# Patient Record
Sex: MALE | Race: BLACK OR AFRICAN AMERICAN | Employment: UNEMPLOYED | URBAN - METROPOLITAN AREA
[De-identification: names, ages, dates, MRNs, and addresses within clinical notes are randomized per-mention and may not be internally consistent; named-entity substitution may affect disease eponyms.]

---

## 2017-09-01 ENCOUNTER — OFFICE VISIT (OUTPATIENT)
Dept: FAMILY MEDICINE CLINIC | Age: 22
End: 2017-09-01
Payer: MEDICAID

## 2017-09-01 VITALS
TEMPERATURE: 98.7 F | DIASTOLIC BLOOD PRESSURE: 46 MMHG | BODY MASS INDEX: 17.44 KG/M2 | HEIGHT: 72 IN | SYSTOLIC BLOOD PRESSURE: 107 MMHG | HEART RATE: 64 BPM | WEIGHT: 128.8 LBS

## 2017-09-01 DIAGNOSIS — M41.124 ADOLESCENT IDIOPATHIC SCOLIOSIS OF THORACIC REGION: Primary | ICD-10-CM

## 2017-09-01 DIAGNOSIS — Z11.4 SCREENING FOR HIV (HUMAN IMMUNODEFICIENCY VIRUS): ICD-10-CM

## 2017-09-01 PROCEDURE — 99203 OFFICE O/P NEW LOW 30 MIN: CPT | Performed by: FAMILY MEDICINE

## 2017-09-01 ASSESSMENT — ENCOUNTER SYMPTOMS
CHEST TIGHTNESS: 0
APNEA: 0
COUGH: 0
CHOKING: 0

## 2018-01-12 ENCOUNTER — HOSPITAL ENCOUNTER (EMERGENCY)
Age: 23
Discharge: HOME | End: 2018-01-12
Attending: EMERGENCY MEDICINE
Payer: MEDICAID

## 2018-01-12 VITALS
DIASTOLIC BLOOD PRESSURE: 78 MMHG | SYSTOLIC BLOOD PRESSURE: 107 MMHG | RESPIRATION RATE: 18 BRPM | BODY MASS INDEX: 19.67 KG/M2 | HEART RATE: 72 BPM | WEIGHT: 145 LBS | OXYGEN SATURATION: 100 % | TEMPERATURE: 98.6 F

## 2018-01-12 DIAGNOSIS — F10.920 ACUTE ALCOHOLIC INTOXICATION WITHOUT COMPLICATION (HCC): ICD-10-CM

## 2018-01-12 DIAGNOSIS — R11.2 NAUSEA AND VOMITING, INTRACTABILITY OF VOMITING NOT SPECIFIED, UNSPECIFIED VOMITING TYPE: ICD-10-CM

## 2018-01-12 DIAGNOSIS — F12.10 MILD TETRAHYDROCANNABINOL (THC) ABUSE: ICD-10-CM

## 2018-01-12 DIAGNOSIS — R10.9 ABDOMINAL PAIN, UNSPECIFIED ABDOMINAL LOCATION: Primary | ICD-10-CM

## 2018-01-12 LAB
AMPHETAMINE SCREEN URINE: NEGATIVE
BARBITURATE SCREEN URINE: NEGATIVE
BENZODIAZEPINE SCREEN, URINE: NEGATIVE
BUPRENORPHINE URINE: ABNORMAL
CANNABINOID SCREEN URINE: POSITIVE
COCAINE METABOLITE, URINE: NEGATIVE
MDMA URINE: ABNORMAL
METHADONE SCREEN, URINE: NEGATIVE
METHAMPHETAMINE, URINE: ABNORMAL
OPIATES, URINE: NEGATIVE
OXYCODONE SCREEN URINE: NEGATIVE
PHENCYCLIDINE, URINE: NEGATIVE
PROPOXYPHENE, URINE: ABNORMAL
TEST INFORMATION: ABNORMAL
TRICYCLIC ANTIDEPRESSANTS, UR: ABNORMAL

## 2018-01-12 PROCEDURE — 99284 EMERGENCY DEPT VISIT MOD MDM: CPT

## 2018-01-12 PROCEDURE — 6360000002 HC RX W HCPCS: Performed by: EMERGENCY MEDICINE

## 2018-01-12 PROCEDURE — 96374 THER/PROPH/DIAG INJ IV PUSH: CPT

## 2018-01-12 PROCEDURE — 80307 DRUG TEST PRSMV CHEM ANLYZR: CPT

## 2018-01-12 PROCEDURE — 2580000003 HC RX 258: Performed by: EMERGENCY MEDICINE

## 2018-01-12 RX ORDER — ONDANSETRON 4 MG/1
4 TABLET, FILM COATED ORAL ONCE
Status: COMPLETED | OUTPATIENT
Start: 2018-01-12 | End: 2018-01-12

## 2018-01-12 RX ORDER — ONDANSETRON 4 MG/1
4 TABLET, FILM COATED ORAL EVERY 8 HOURS PRN
Qty: 10 TABLET | Refills: 0 | Status: SHIPPED | OUTPATIENT
Start: 2018-01-12 | End: 2018-09-17

## 2018-01-12 RX ORDER — ONDANSETRON 2 MG/ML
INJECTION INTRAMUSCULAR; INTRAVENOUS
Status: DISCONTINUED
Start: 2018-01-12 | End: 2018-01-12 | Stop reason: HOSPADM

## 2018-01-12 RX ORDER — ONDANSETRON 2 MG/ML
4 INJECTION INTRAMUSCULAR; INTRAVENOUS ONCE
Status: COMPLETED | OUTPATIENT
Start: 2018-01-12 | End: 2018-01-12

## 2018-01-12 RX ORDER — 0.9 % SODIUM CHLORIDE 0.9 %
1000 INTRAVENOUS SOLUTION INTRAVENOUS ONCE
Status: COMPLETED | OUTPATIENT
Start: 2018-01-12 | End: 2018-01-12

## 2018-01-12 RX ORDER — ONDANSETRON 2 MG/ML
4 INJECTION INTRAMUSCULAR; INTRAVENOUS ONCE
Status: DISCONTINUED | OUTPATIENT
Start: 2018-01-12 | End: 2018-01-12

## 2018-01-12 RX ORDER — 0.9 % SODIUM CHLORIDE 0.9 %
1000 INTRAVENOUS SOLUTION INTRAVENOUS ONCE
Status: DISCONTINUED | OUTPATIENT
Start: 2018-01-12 | End: 2018-01-12

## 2018-01-12 RX ORDER — MAGNESIUM HYDROXIDE/ALUMINUM HYDROXICE/SIMETHICONE 120; 1200; 1200 MG/30ML; MG/30ML; MG/30ML
30 SUSPENSION ORAL ONCE
Status: DISCONTINUED | OUTPATIENT
Start: 2018-01-12 | End: 2018-01-12 | Stop reason: HOSPADM

## 2018-01-12 RX ADMIN — ONDANSETRON 4 MG: 2 INJECTION INTRAMUSCULAR; INTRAVENOUS at 01:09

## 2018-01-12 RX ADMIN — SODIUM CHLORIDE 1000 ML: 9 INJECTION, SOLUTION INTRAVENOUS at 01:08

## 2018-01-12 RX ADMIN — ONDANSETRON 4 MG: 4 TABLET, FILM COATED ORAL at 00:35

## 2018-01-12 ASSESSMENT — PAIN SCALES - GENERAL: PAINLEVEL_OUTOF10: 5

## 2018-01-12 ASSESSMENT — PAIN DESCRIPTION - DESCRIPTORS: DESCRIPTORS: SHARP

## 2018-01-12 ASSESSMENT — PAIN DESCRIPTION - LOCATION: LOCATION: ABDOMEN
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
## 2018-09-17 ENCOUNTER — OFFICE VISIT (OUTPATIENT)
Dept: FAMILY MEDICINE CLINIC | Age: 23
End: 2018-09-17
Payer: MEDICAID

## 2018-09-17 VITALS
DIASTOLIC BLOOD PRESSURE: 69 MMHG | SYSTOLIC BLOOD PRESSURE: 109 MMHG | TEMPERATURE: 97.8 F | WEIGHT: 150 LBS | HEART RATE: 55 BPM | BODY MASS INDEX: 18.65 KG/M2 | HEIGHT: 75 IN

## 2018-09-17 DIAGNOSIS — Z11.4 ENCOUNTER FOR SCREENING FOR HIV: ICD-10-CM

## 2018-09-17 DIAGNOSIS — Z00.00 ANNUAL PHYSICAL EXAM: Primary | ICD-10-CM

## 2018-09-17 DIAGNOSIS — H61.22 LEFT EAR IMPACTED CERUMEN: ICD-10-CM

## 2018-09-17 DIAGNOSIS — Z23 NEED FOR VACCINATION: ICD-10-CM

## 2018-09-17 PROCEDURE — G0008 ADMIN INFLUENZA VIRUS VAC: HCPCS | Performed by: FAMILY MEDICINE

## 2018-09-17 PROCEDURE — G8427 DOCREV CUR MEDS BY ELIG CLIN: HCPCS | Performed by: FAMILY MEDICINE

## 2018-09-17 PROCEDURE — G8420 CALC BMI NORM PARAMETERS: HCPCS | Performed by: FAMILY MEDICINE

## 2018-09-17 PROCEDURE — 99213 OFFICE O/P EST LOW 20 MIN: CPT | Performed by: FAMILY MEDICINE

## 2018-09-17 PROCEDURE — 90715 TDAP VACCINE 7 YRS/> IM: CPT | Performed by: FAMILY MEDICINE

## 2018-09-17 PROCEDURE — 1036F TOBACCO NON-USER: CPT | Performed by: FAMILY MEDICINE

## 2018-09-17 ASSESSMENT — PATIENT HEALTH QUESTIONNAIRE - PHQ9
SUM OF ALL RESPONSES TO PHQ9 QUESTIONS 1 & 2: 0
1. LITTLE INTEREST OR PLEASURE IN DOING THINGS: 0
2. FEELING DOWN, DEPRESSED OR HOPELESS: 0
SUM OF ALL RESPONSES TO PHQ QUESTIONS 1-9: 0
SUM OF ALL RESPONSES TO PHQ QUESTIONS 1-9: 0

## 2018-09-17 ASSESSMENT — ENCOUNTER SYMPTOMS
CONSTIPATION: 0
DIARRHEA: 0
SHORTNESS OF BREATH: 0
BLURRED VISION: 0
VOMITING: 0
NAUSEA: 0
COUGH: 0
ABDOMINAL PAIN: 0
WHEEZING: 0

## 2018-09-17 NOTE — PATIENT INSTRUCTIONS
Thank you for letting us take care of you today. We hope all your questions were addressed. If a question was overlooked or something else comes to mind after you return home, please contact a member of your Care Team listed below. Please make sure you have a routine office visit set up to follow-up on [address]. Your Care Team at [place] [address] is Team #2  Karon Encinas DO (Faculty)  Betsy Varela MD (Resident)  Leta Simmonds, MD (Resident)  Alexsander Alas MD (Resident)  Maria Teresa aGo MD (Resident)  Federica Rodríguez MD (Resident)  Santosh Wu, SATURNINO  Arnoldo Lott., Ivan Velasco, [address] ([facility])  Kami Levi RN, ([address] [facility])  Alec Jeffries, Ph.D., (Behavioral Services)  Renee Small, [address] [place] [address] (Clinical Pharmacist)     Office phone number: [phone]    If you need to get in right away due to illness, please be advised we have \"Same Day\" appointments available Monday-Friday. Please call us at [phone] option #3 to schedule your \"Same Day\" appointment. Patient Education        Earwax Blockage: Care Instructions  Your Care Instructions    Earwax is a natural substance that protects the ear canal. Normally, earwax drains from the ears and does not cause problems. Sometimes earwax builds up and hardens. Earwax blockage (also called cerumen impaction) can cause some loss of hearing and pain. When wax is tightly packed, you will need to have your doctor remove it. Follow-up care is a key part of your treatment and safety. Be sure to make and go to all appointments, and call your doctor if you are having problems. It's also a good idea to know your test results and keep a list of the medicines you take. How can you care for yourself at home? · Do not try to remove earwax with cotton swabs, fingers, or other objects. This can make the blockage worse and damage the eardrum.   · If your doctor recommends that you try to remove earwax at home:  ¨ Soften and loosen the earwax with warm mineral oil. You also can try hydrogen peroxide mixed with an equal amount of room temperature water. Place 2 drops of the fluid, warmed to body temperature, in the ear two times a day for up to 5 days. ¨ Once the wax is loose and soft, all that is usually needed to remove it from the ear canal is a gentle, warm shower. Direct the water into the ear, then tip your head to let the earwax drain out. Dry your ear thoroughly with a hair dryer set on low. Hold the dryer several inches from your ear. ¨ If the warm mineral oil and shower do not work, use an over-the-counter wax softener. Read and follow all instructions on the label. After using the wax softener, use an ear syringe to gently flush the ear. Make sure the flushing solution is body temperature. Cool or hot fluids in the ear can cause dizziness. When should you call for help? Call your doctor now or seek immediate medical care if:    · Pus or blood drains from your ear.     · Your ears are ringing or feel full.     · You have a loss of hearing.    Watch closely for changes in your health, and be sure to contact your doctor if:    · You have pain or reduced hearing after 1 week of home treatment.     · You have any new symptoms, such as nausea or balance problems. Where can you learn more? Go to https://Crispy Driven PixelspeLaunchPointeb.united healthcare practice solutions. org and sign in to your I Gotchu account. Enter N491 in the [facility] box to learn more about \"Earwax Blockage: Care Instructions. \"     If you do not have an account, please click on the \"Sign Up Now\" link. Current as of: November 20, 2017  Content Version: 11.7  © 6078-4433 [employer]. Care instructions adapted under license by [facility]). If you have questions about a medical condition or this instruction, always ask your healthcare professional. [address] any warranty or liability for your use of this information.

## 2018-10-29 ENCOUNTER — OFFICE VISIT (OUTPATIENT)
Dept: FAMILY MEDICINE CLINIC | Age: 23
End: 2018-10-29
Payer: MEDICAID

## 2018-10-29 ENCOUNTER — HOSPITAL ENCOUNTER (OUTPATIENT)
Age: 23
Setting detail: SPECIMEN
Discharge: HOME | End: 2018-10-29
Payer: MEDICAID

## 2018-10-29 VITALS
WEIGHT: 135 LBS | TEMPERATURE: 97.9 F | DIASTOLIC BLOOD PRESSURE: 74 MMHG | HEIGHT: 73 IN | BODY MASS INDEX: 17.89 KG/M2 | HEART RATE: 56 BPM | SYSTOLIC BLOOD PRESSURE: 114 MMHG

## 2018-10-29 DIAGNOSIS — H61.22 LEFT EAR IMPACTED CERUMEN: Primary | ICD-10-CM

## 2018-10-29 DIAGNOSIS — Z11.4 ENCOUNTER FOR SCREENING FOR HIV: ICD-10-CM

## 2018-10-29 LAB — HIV AG/AB: NONREACTIVE

## 2018-10-29 PROCEDURE — G8419 CALC BMI OUT NRM PARAM NOF/U: HCPCS | Performed by: FAMILY MEDICINE

## 2018-10-29 PROCEDURE — 1036F TOBACCO NON-USER: CPT | Performed by: FAMILY MEDICINE

## 2018-10-29 PROCEDURE — G8484 FLU IMMUNIZE NO ADMIN: HCPCS | Performed by: FAMILY MEDICINE

## 2018-10-29 PROCEDURE — 99213 OFFICE O/P EST LOW 20 MIN: CPT | Performed by: FAMILY MEDICINE

## 2018-10-29 PROCEDURE — G8427 DOCREV CUR MEDS BY ELIG CLIN: HCPCS | Performed by: FAMILY MEDICINE

## 2018-10-29 ASSESSMENT — ENCOUNTER SYMPTOMS
NAUSEA: 0
SHORTNESS OF BREATH: 0
COUGH: 0
CONSTIPATION: 0
DIARRHEA: 0
SORE THROAT: 0
BACK PAIN: 0
VOMITING: 0
ABDOMINAL PAIN: 0

## 2018-10-29 NOTE — PATIENT INSTRUCTIONS
Thank you for letting us take care of you today. We hope all your questions were addressed. If a question was overlooked or something else comes to mind after you return home, please contact a member of your Care Team listed below. Please make sure you have a routine office visit set up to follow-up on [address]. Your Care Team at [place] [address] is Team #4  Velia Saunders MD (Faculty)  Henrry Sandoval MD (Naveen Vicente MD (Resident)  Emelina Antonio MD (Resident)  Tavo Hilton MD (Resident)  Juan Araiza MD (Resident)  Misti Núñez MD (Resident)  JEFFERY Estrada., JEB Saavedra., CHANDRIKA Dougherty ([facility])  Finn Acosta RN, ([address] [facility])  Shannon Ivey, Ph.D., (Behavioral Services)  Trevon Barger, [address] [place] [address] (Clinical Pharmacist)       Office phone number: [phone]    If you need to get in right away due to illness, please be advised we have \"Same Day\" appointments available Monday-Friday. Please call us at [phone] option #3 to schedule your \"Same Day\" appointment. Patient Education        Earwax Blockage: Care Instructions  Your Care Instructions    Earwax is a natural substance that protects the ear canal. Normally, earwax drains from the ears and does not cause problems. Sometimes earwax builds up and hardens. Earwax blockage (also called cerumen impaction) can cause some loss of hearing and pain. When wax is tightly packed, you will need to have your doctor remove it. Follow-up care is a key part of your treatment and safety. Be sure to make and go to all appointments, and call your doctor if you are having problems. It's also a good idea to know your test results and keep a list of the medicines you take. How can you care for yourself at home? · Do not try to remove earwax with cotton swabs, fingers, or other objects. This can make the blockage worse and damage the eardrum.   · If your doctor recommends that you try to remove earwax at home:  ¨ Soften and loosen the earwax with warm mineral oil. You also can try hydrogen peroxide mixed with an equal amount of room temperature water. Place 2 drops of the fluid, warmed to body temperature, in the ear two times a day for up to 5 days. ¨ Once the wax is loose and soft, all that is usually needed to remove it from the ear canal is a gentle, warm shower. Direct the water into the ear, then tip your head to let the earwax drain out. Dry your ear thoroughly with a hair dryer set on low. Hold the dryer several inches from your ear. ¨ If the warm mineral oil and shower do not work, use an over-the-counter wax softener. Read and follow all instructions on the label. After using the wax softener, use an ear syringe to gently flush the ear. Make sure the flushing solution is body temperature. Cool or hot fluids in the ear can cause dizziness. When should you call for help? Call your doctor now or seek immediate medical care if:    · Pus or blood drains from your ear.     · Your ears are ringing or feel full.     · You have a loss of hearing.    Watch closely for changes in your health, and be sure to contact your doctor if:    · You have pain or reduced hearing after 1 week of home treatment.     · You have any new symptoms, such as nausea or balance problems. Where can you learn more? Go to https://LYFE KitchenpefromAtoB.Lettuce. org and sign in to your [employer] account. Enter D726 in the [facility] box to learn more about \"Earwax Blockage: Care Instructions. \"     If you do not have an account, please click on the \"Sign Up Now\" link. Current as of: November 20, 2017  Content Version: 11.7  © 1128-7731 [employer]. Care instructions adapted under license by [facility] ([facility]).  If you have questions about a medical condition or this instruction, always ask your healthcare professional. [address] any warranty or liability for your use of this

## 2019-08-01 ENCOUNTER — OFFICE VISIT (OUTPATIENT)
Dept: FAMILY MEDICINE CLINIC | Age: 24
End: 2019-08-01
Payer: MEDICAID

## 2019-08-01 VITALS
BODY MASS INDEX: 19.03 KG/M2 | WEIGHT: 143.6 LBS | HEIGHT: 73 IN | HEART RATE: 56 BPM | DIASTOLIC BLOOD PRESSURE: 80 MMHG | SYSTOLIC BLOOD PRESSURE: 117 MMHG

## 2019-08-01 DIAGNOSIS — G89.29 CHRONIC BILATERAL LOW BACK PAIN WITHOUT SCIATICA: Primary | ICD-10-CM

## 2019-08-01 DIAGNOSIS — M54.50 CHRONIC BILATERAL LOW BACK PAIN WITHOUT SCIATICA: Primary | ICD-10-CM

## 2019-08-01 PROCEDURE — 99213 OFFICE O/P EST LOW 20 MIN: CPT | Performed by: STUDENT IN AN ORGANIZED HEALTH CARE EDUCATION/TRAINING PROGRAM

## 2019-08-01 PROCEDURE — 1036F TOBACCO NON-USER: CPT | Performed by: STUDENT IN AN ORGANIZED HEALTH CARE EDUCATION/TRAINING PROGRAM

## 2019-08-01 PROCEDURE — G8420 CALC BMI NORM PARAMETERS: HCPCS | Performed by: STUDENT IN AN ORGANIZED HEALTH CARE EDUCATION/TRAINING PROGRAM

## 2019-08-01 PROCEDURE — G8427 DOCREV CUR MEDS BY ELIG CLIN: HCPCS | Performed by: STUDENT IN AN ORGANIZED HEALTH CARE EDUCATION/TRAINING PROGRAM

## 2019-08-01 ASSESSMENT — PATIENT HEALTH QUESTIONNAIRE - PHQ9
SUM OF ALL RESPONSES TO PHQ9 QUESTIONS 1 & 2: 0
SUM OF ALL RESPONSES TO PHQ QUESTIONS 1-9: 0
SUM OF ALL RESPONSES TO PHQ QUESTIONS 1-9: 0
1. LITTLE INTEREST OR PLEASURE IN DOING THINGS: 0
2. FEELING DOWN, DEPRESSED OR HOPELESS: 0

## 2019-08-01 ASSESSMENT — ENCOUNTER SYMPTOMS
SHORTNESS OF BREATH: 0
BACK PAIN: 1
CHEST TIGHTNESS: 0

## 2019-08-01 NOTE — PROGRESS NOTES
Attending Physician Statement  I have discussed the care of [address] pertinent history and exam findings,  with the resident. I have reviewed the key elements of all parts of the encounter with the resident. I agree with the assessment, plan and orders as documented by the resident.   (GE Modifier)    Chronic LBP- worsening- PT/Xray/Consider referral to Dr.Smith WANG next visit
Skin is warm and dry. Nursing note and vitals reviewed. No results found for: WBC, HGB, HCT, PLT, CHOL, TRIG, HDL, LDLDIRECT, ALT, AST, NA, K, CL, CREATININE, BUN, CO2, TSH, PSA, INR, GLUF, LABA1C, LABMICR  No results found for: CALCIUM, PHOS  No results found for: LDLCALC, LDLCHOLESTEROL, LDLDIRECT    Assessment and Plan:    1. Chronic bilateral low back pain without sciatica  - NSAIDs, naproxen as directed  - [address], patient agreedable and interested in aqua therapy  - XR LUMBAR SPINE (2-3 VIEWS); Future  - XR THORACIC SPINE (2 VIEWS); Future  - Imaging as ordered above for chronic back pain and hx of scoliosis with no imaging on file  - F/u in 3 months for back pain        Requested Prescriptions      No prescriptions requested or ordered in this encounter       There are no discontinued medications. Liza Mark received counseling on the following healthy behaviors: nutrition, exercise and medication adherence    Discussed use,benefit, and side effects of prescribed medications. Barriers to medication compliance addressed. All patient questions answered. Pt voiced understanding. Return in about 3 months (around 11/1/2019) for follow up, back pain. Disclaimer: Some orall of this note was transcribed using voice-recognition software. This may cause typographical errors occasionally. Although all effort is made to fix these errors, please do not hesitate to contact our office if there Dia Rubi concern with the understanding of this note.

## 2019-08-07 ENCOUNTER — HOSPITAL ENCOUNTER (OUTPATIENT)
Dept: PHYSICAL THERAPY | Age: 24
Setting detail: THERAPIES SERIES
Discharge: HOME | End: 2019-08-07
Payer: MEDICAID

## 2019-08-07 PROCEDURE — 97161 PT EVAL LOW COMPLEX 20 MIN: CPT

## 2019-08-07 PROCEDURE — 97110 THERAPEUTIC EXERCISES: CPT

## 2019-08-07 ASSESSMENT — PAIN DESCRIPTION - ONSET: ONSET: ON-GOING

## 2019-08-07 ASSESSMENT — PAIN - FUNCTIONAL ASSESSMENT: PAIN_FUNCTIONAL_ASSESSMENT: PREVENTS OR INTERFERES WITH MANY ACTIVE NOT PASSIVE ACTIVITIES

## 2019-08-07 ASSESSMENT — PAIN DESCRIPTION - FREQUENCY: FREQUENCY: INTERMITTENT

## 2019-08-07 ASSESSMENT — PAIN DESCRIPTION - PAIN TYPE: TYPE: CHRONIC PAIN

## 2019-08-07 ASSESSMENT — PAIN SCALES - GENERAL: PAINLEVEL_OUTOF10: 6

## 2019-08-07 ASSESSMENT — PAIN DESCRIPTION - ORIENTATION: ORIENTATION: LOWER

## 2019-08-07 ASSESSMENT — PAIN DESCRIPTION - DESCRIPTORS: DESCRIPTORS: ACHING;SHARP;STABBING

## 2019-08-07 ASSESSMENT — PAIN DESCRIPTION - LOCATION: LOCATION: BACK

## 2019-08-07 ASSESSMENT — PAIN DESCRIPTION - PROGRESSION: CLINICAL_PROGRESSION: GRADUALLY WORSENING

## 2019-08-07 NOTE — PROGRESS NOTES
Tolerance  Activity Tolerance: Patient Tolerated treatment well  Comments: rx/eval short due to patient being late for evaluation. Plan     Patient Goals: Learn stretches for my low back, and find out where the pain is coming from. Comments/Assessment:    Rehab Potential:  [x] Good  [] Fair  [] Poor   Suggested Professional Referral:  [x] No  [] Yes:  Barriers to Goal Achievement:  [x] No  [] Yes:  Domestic Concerns:  [x] No  [] Yes:    Treatment Plan:  [x] Therapeutic Exercise    [x] Modalities: as needed  [] Therapeutic Activity    [] Ultrasound  [x] Electrical Stimulation  [] Gait Training     [x] Massage       [x] Lumbar Traction  [] Neuromuscular Re-education [x] Cold/hot pack [] Other:  [x] Instruction in HEP              [] Work Conditioning                                  [] Manual Therapy                     [] Aquatic Therapy     [] Vasocompression  [] Iontophoresis: 4 mg/mL                      Dexamethasone Sodium      Phosphate 40-80mAmin (Allergies Reviewed)     Frequency: 2-3   X/wk x   4-6       Wk's (12 visits total)      [x] Plans/Goals, Risk/Benefits discussed with pt/family  Comprehension of Education [] yes  [x] Needs Review  Pt/Family Education: [x] Verbal  [x] Demo  [] Written-HEP body mech for lifting his son, prone hip extension 10x3\" hold, R HS stretch 3x30\"; continue with child's pose stretch -> extension      Thank you for this referral.        [facility]) @ [facility]  [address]  [place], [address] [place] [address]  Phone [phone]  Fax [phone]      OutComes Score   Optimal to be assessed at next visit. Goals  Short term goals  Time Frame for Short term goals: 6 visits  Short term goal 1: Assess Optimal  Short term goal 2: Ed on body mechanics for daily activities to dec stress on low back.    Short term goal 3: Initiate HEP for core strengthening  Long term goals  Time Frame for Long term goals : 12 visits  Long term goal 1: Optimal <25% limited  Long term goal 2: Proper demonstration of body mechanics for ADL's  Long term goal 3: Improve bilat hip ext strength to 4+/5 for easier ADL's  Patient Goals   Patient goals : Find out where the pain is coming from and learn stretches to deal with it. Therapy Time   Individual Concurrent Group Co-treatment   Time In 1130         Time Out 9912         Minutes 37             Evaluation, exercise  Next visit- add stim/heat.    Basia Tiwari, PT

## 2019-09-04 ENCOUNTER — APPOINTMENT (OUTPATIENT)
Dept: GENERAL RADIOLOGY | Age: 24
End: 2019-09-04
Payer: MEDICAID

## 2019-09-04 ENCOUNTER — HOSPITAL ENCOUNTER (EMERGENCY)
Age: 24
Discharge: HOME | End: 2019-09-04
Attending: EMERGENCY MEDICINE
Payer: MEDICAID

## 2019-09-04 VITALS
DIASTOLIC BLOOD PRESSURE: 83 MMHG | WEIGHT: 150 LBS | BODY MASS INDEX: 19.88 KG/M2 | TEMPERATURE: 98.4 F | RESPIRATION RATE: 16 BRPM | HEIGHT: 73 IN | OXYGEN SATURATION: 98 % | HEART RATE: 92 BPM | SYSTOLIC BLOOD PRESSURE: 121 MMHG

## 2019-09-04 DIAGNOSIS — J02.9 VIRAL PHARYNGITIS: Primary | ICD-10-CM

## 2019-09-04 PROCEDURE — 70360 X-RAY EXAM OF NECK: CPT

## 2019-09-04 PROCEDURE — 99283 EMERGENCY DEPT VISIT LOW MDM: CPT

## 2019-09-04 PROCEDURE — 6370000000 HC RX 637 (ALT 250 FOR IP): Performed by: STUDENT IN AN ORGANIZED HEALTH CARE EDUCATION/TRAINING PROGRAM

## 2019-09-04 RX ADMIN — BENZOCAINE AND MENTHOL 1 LOZENGE: 15; 3.6 LOZENGE ORAL at 04:28

## 2019-09-04 ASSESSMENT — ENCOUNTER SYMPTOMS
COUGH: 0
ABDOMINAL PAIN: 0
CHEST TIGHTNESS: 0
TROUBLE SWALLOWING: 1
VOMITING: 0
NAUSEA: 0
FACIAL SWELLING: 0
SHORTNESS OF BREATH: 0
WHEEZING: 0
SORE THROAT: 1

## 2019-09-04 ASSESSMENT — PAIN DESCRIPTION - LOCATION: LOCATION: THROAT

## 2019-09-04 ASSESSMENT — PAIN SCALES - GENERAL: PAINLEVEL_OUTOF10: 10

## 2019-09-04 ASSESSMENT — PAIN DESCRIPTION - PAIN TYPE: TYPE: ACUTE PAIN

## 2019-09-04 NOTE — ED PROVIDER NOTES
is maintaining secretions, no drooling, choking, or respiratory distress. No neck or submandibular swelling or tenderness. Uvula is midline. No tonsillar exudates or evidence for tonsillar abscess. Eyes: Conjunctivae are normal.   Neck: Normal range of motion. Neck supple. Cardiovascular: Normal rate, regular rhythm and normal heart sounds. Exam reveals no gallop and no friction rub. No murmur heard. Pulmonary/Chest: Effort normal and breath sounds normal. No stridor. No respiratory distress. He has no wheezes. He has no rales. Abdominal: Soft. He exhibits no distension and no mass. There is no tenderness. There is no guarding. Musculoskeletal: He exhibits no edema. Lymphadenopathy:     He has cervical adenopathy. Neurological: He is alert and oriented to person, place, and time. Skin: Skin is warm and dry. He is not diaphoretic. Nursing note and vitals reviewed. DIFFERENTIAL  DIAGNOSIS     PLAN (LABS / IMAGING / EKG):  Orders Placed This Encounter   Procedures    XR Neck Soft Tissue       MEDICATIONS ORDERED:  Orders Placed This Encounter   Medications    benzocaine-menthol (CEPACOL SORE THROAT) lozenge 1 lozenge    Benzocaine-Menthol (CEPACOL) 6-10 MG LOZG lozenge     Sig: Take 1 lozenge by mouth every 2 hours as needed for Sore Throat     Dispense:  60 lozenge     Refill:  0       DDX: Viral pharyngitis, foreign body, globus hystericus    Initial MDM/Plan/ED course: 22 y.o. male who presents with sore throat for 1 day and painful swallowing. On exam patient is in no acute distress and is drinking water currently but without any regurgitation or vomiting. Patient nontoxic-appearing. Physical exam unremarkable with only finding of anterior cervical lymphadenopathy. No tonsillar exudates, fever. Patient does have a cough. Centor score of 1, no need for antibiotics for strep throat.   Patient keeps complaining that he feels like there is something stuck in his throat and that he Throat, Disp-60 lozenge, R-0Print             Halima Strong DO  Emergency Medicine Resident    (Please note that portions of this note were completed with a voice recognition program.Efforts were made to edit the dictations but occasionally words are mis-transcribed.)        Lg Dobbs DO  Resident  09/04/19 9662

## 2019-10-14 ENCOUNTER — OFFICE VISIT (OUTPATIENT)
Dept: FAMILY MEDICINE CLINIC | Age: 24
End: 2019-10-14
Payer: MEDICAID

## 2019-10-14 VITALS
BODY MASS INDEX: 18.69 KG/M2 | SYSTOLIC BLOOD PRESSURE: 117 MMHG | HEART RATE: 85 BPM | HEIGHT: 73 IN | WEIGHT: 141 LBS | DIASTOLIC BLOOD PRESSURE: 74 MMHG

## 2019-10-14 DIAGNOSIS — M54.50 CHRONIC BILATERAL LOW BACK PAIN WITHOUT SCIATICA: Primary | ICD-10-CM

## 2019-10-14 DIAGNOSIS — G89.29 CHRONIC BILATERAL LOW BACK PAIN WITHOUT SCIATICA: Primary | ICD-10-CM

## 2019-10-14 DIAGNOSIS — F48.9 MENTAL HEALTH PROBLEM: ICD-10-CM

## 2019-10-14 PROBLEM — J02.9 VIRAL PHARYNGITIS: Status: ACTIVE | Noted: 2019-10-14

## 2019-10-14 PROCEDURE — G8427 DOCREV CUR MEDS BY ELIG CLIN: HCPCS | Performed by: STUDENT IN AN ORGANIZED HEALTH CARE EDUCATION/TRAINING PROGRAM

## 2019-10-14 PROCEDURE — 4004F PT TOBACCO SCREEN RCVD TLK: CPT | Performed by: STUDENT IN AN ORGANIZED HEALTH CARE EDUCATION/TRAINING PROGRAM

## 2019-10-14 PROCEDURE — G8420 CALC BMI NORM PARAMETERS: HCPCS | Performed by: STUDENT IN AN ORGANIZED HEALTH CARE EDUCATION/TRAINING PROGRAM

## 2019-10-14 PROCEDURE — G8484 FLU IMMUNIZE NO ADMIN: HCPCS | Performed by: STUDENT IN AN ORGANIZED HEALTH CARE EDUCATION/TRAINING PROGRAM

## 2019-10-14 PROCEDURE — 99213 OFFICE O/P EST LOW 20 MIN: CPT | Performed by: STUDENT IN AN ORGANIZED HEALTH CARE EDUCATION/TRAINING PROGRAM

## 2019-10-14 ASSESSMENT — ENCOUNTER SYMPTOMS
BACK PAIN: 1
APNEA: 0
CHEST TIGHTNESS: 0
COUGH: 0
SHORTNESS OF BREATH: 0

## 2019-10-14 ASSESSMENT — PATIENT HEALTH QUESTIONNAIRE - PHQ9
SUM OF ALL RESPONSES TO PHQ QUESTIONS 1-9: 0
2. FEELING DOWN, DEPRESSED OR HOPELESS: 0
SUM OF ALL RESPONSES TO PHQ QUESTIONS 1-9: 0
SUM OF ALL RESPONSES TO PHQ9 QUESTIONS 1 & 2: 0
1. LITTLE INTEREST OR PLEASURE IN DOING THINGS: 0

## 2019-11-29 ENCOUNTER — TELEPHONE (OUTPATIENT)
Dept: FAMILY MEDICINE CLINIC | Age: 24
End: 2019-11-29

## 2019-11-29 DIAGNOSIS — M54.50 CHRONIC BILATERAL LOW BACK PAIN WITHOUT SCIATICA: Primary | ICD-10-CM

## 2019-11-29 DIAGNOSIS — G89.29 CHRONIC BILATERAL LOW BACK PAIN WITHOUT SCIATICA: Primary | ICD-10-CM

## 2019-12-19 ENCOUNTER — HOSPITAL ENCOUNTER (EMERGENCY)
Age: 24
Discharge: HOME | End: 2019-12-19
Attending: EMERGENCY MEDICINE
Payer: MEDICAID

## 2019-12-19 VITALS
BODY MASS INDEX: 19.5 KG/M2 | OXYGEN SATURATION: 100 % | RESPIRATION RATE: 14 BRPM | HEART RATE: 65 BPM | SYSTOLIC BLOOD PRESSURE: 127 MMHG | WEIGHT: 144 LBS | DIASTOLIC BLOOD PRESSURE: 84 MMHG | HEIGHT: 72 IN | TEMPERATURE: 98.4 F

## 2019-12-19 DIAGNOSIS — K13.79 BLEEDING FROM MOUTH: Primary | ICD-10-CM

## 2019-12-19 PROCEDURE — 96374 THER/PROPH/DIAG INJ IV PUSH: CPT

## 2019-12-19 PROCEDURE — 6360000002 HC RX W HCPCS: Performed by: PHYSICIAN ASSISTANT

## 2019-12-19 PROCEDURE — 6370000000 HC RX 637 (ALT 250 FOR IP): Performed by: PHYSICIAN ASSISTANT

## 2019-12-19 PROCEDURE — 99283 EMERGENCY DEPT VISIT LOW MDM: CPT

## 2019-12-19 RX ORDER — DEXAMETHASONE SODIUM PHOSPHATE 10 MG/ML
10 INJECTION INTRAMUSCULAR; INTRAVENOUS ONCE
Status: COMPLETED | OUTPATIENT
Start: 2019-12-19 | End: 2019-12-19

## 2019-12-19 RX ORDER — DIPHENHYDRAMINE HCL 25 MG
25 TABLET ORAL EVERY 6 HOURS PRN
Status: DISCONTINUED | OUTPATIENT
Start: 2019-12-19 | End: 2019-12-19 | Stop reason: HOSPADM

## 2019-12-19 RX ADMIN — DIPHENHYDRAMINE HCL 25 MG: 25 TABLET ORAL at 17:10

## 2019-12-19 RX ADMIN — DEXAMETHASONE SODIUM PHOSPHATE 10 MG: 10 INJECTION INTRAMUSCULAR; INTRAVENOUS at 17:10

## 2019-12-19 ASSESSMENT — ENCOUNTER SYMPTOMS
EYE DISCHARGE: 0
TROUBLE SWALLOWING: 1
SORE THROAT: 0
COUGH: 0
SHORTNESS OF BREATH: 0
NAUSEA: 0
EYE PAIN: 0
BACK PAIN: 0
WHEEZING: 0
VOMITING: 0
RHINORRHEA: 0
EYE ITCHING: 0

## 2019-12-19 ASSESSMENT — PAIN SCALES - GENERAL: PAINLEVEL_OUTOF10: 10

## 2019-12-19 ASSESSMENT — PAIN DESCRIPTION - LOCATION: LOCATION: MOUTH

## 2019-12-22 ENCOUNTER — HOSPITAL ENCOUNTER (EMERGENCY)
Age: 24
Discharge: HOME | End: 2019-12-22
Attending: EMERGENCY MEDICINE
Payer: MEDICAID

## 2019-12-22 VITALS
HEART RATE: 74 BPM | OXYGEN SATURATION: 98 % | RESPIRATION RATE: 16 BRPM | TEMPERATURE: 98.7 F | DIASTOLIC BLOOD PRESSURE: 74 MMHG | SYSTOLIC BLOOD PRESSURE: 126 MMHG

## 2019-12-22 DIAGNOSIS — J02.9 ACUTE PHARYNGITIS, UNSPECIFIED ETIOLOGY: Primary | ICD-10-CM

## 2019-12-22 PROCEDURE — 6370000000 HC RX 637 (ALT 250 FOR IP): Performed by: EMERGENCY MEDICINE

## 2019-12-22 PROCEDURE — 99282 EMERGENCY DEPT VISIT SF MDM: CPT

## 2019-12-22 RX ORDER — IBUPROFEN 800 MG/1
800 TABLET ORAL ONCE
Status: DISCONTINUED | OUTPATIENT
Start: 2019-12-22 | End: 2019-12-23 | Stop reason: HOSPADM

## 2019-12-22 RX ADMIN — BENZOCAINE AND MENTHOL 1 LOZENGE: 15; 3.6 LOZENGE ORAL at 22:54

## 2019-12-22 ASSESSMENT — PAIN DESCRIPTION - PAIN TYPE: TYPE: ACUTE PAIN

## 2019-12-22 ASSESSMENT — ENCOUNTER SYMPTOMS
BACK PAIN: 0
ABDOMINAL PAIN: 0
SHORTNESS OF BREATH: 0
COUGH: 0
SORE THROAT: 1
VOMITING: 0

## 2019-12-22 ASSESSMENT — PAIN DESCRIPTION - LOCATION: LOCATION: HEAD;MOUTH

## 2019-12-22 ASSESSMENT — PAIN SCALES - GENERAL: PAINLEVEL_OUTOF10: 10

## 2019-12-27 ENCOUNTER — HOSPITAL ENCOUNTER (EMERGENCY)
Age: 24
Discharge: HOME | End: 2019-12-27
Attending: EMERGENCY MEDICINE
Payer: MEDICAID

## 2019-12-27 VITALS
OXYGEN SATURATION: 96 % | HEART RATE: 68 BPM | DIASTOLIC BLOOD PRESSURE: 77 MMHG | TEMPERATURE: 98.8 F | BODY MASS INDEX: 19.22 KG/M2 | WEIGHT: 145 LBS | HEIGHT: 73 IN | RESPIRATION RATE: 18 BRPM | SYSTOLIC BLOOD PRESSURE: 106 MMHG

## 2019-12-27 DIAGNOSIS — R51.9 NONINTRACTABLE HEADACHE, UNSPECIFIED CHRONICITY PATTERN, UNSPECIFIED HEADACHE TYPE: Primary | ICD-10-CM

## 2019-12-27 PROCEDURE — 6370000000 HC RX 637 (ALT 250 FOR IP): Performed by: EMERGENCY MEDICINE

## 2019-12-27 PROCEDURE — 99283 EMERGENCY DEPT VISIT LOW MDM: CPT

## 2019-12-27 RX ORDER — IBUPROFEN 800 MG/1
800 TABLET ORAL EVERY 8 HOURS PRN
Qty: 30 TABLET | Refills: 0 | Status: SHIPPED | OUTPATIENT
Start: 2019-12-27 | End: 2020-10-15 | Stop reason: SDUPTHER

## 2019-12-27 RX ORDER — IBUPROFEN 800 MG/1
800 TABLET ORAL ONCE
Status: COMPLETED | OUTPATIENT
Start: 2019-12-27 | End: 2019-12-27

## 2019-12-27 RX ADMIN — IBUPROFEN 800 MG: 800 TABLET, FILM COATED ORAL at 20:57

## 2019-12-27 ASSESSMENT — ENCOUNTER SYMPTOMS
ABDOMINAL PAIN: 0
BACK PAIN: 0
SHORTNESS OF BREATH: 0
SORE THROAT: 0
VOMITING: 0

## 2019-12-27 ASSESSMENT — PAIN SCALES - GENERAL
PAINLEVEL_OUTOF10: 10
PAINLEVEL_OUTOF10: 10

## 2019-12-27 ASSESSMENT — PAIN DESCRIPTION - LOCATION: LOCATION: HEAD

## 2019-12-27 ASSESSMENT — PAIN DESCRIPTION - FREQUENCY: FREQUENCY: INTERMITTENT

## 2019-12-27 ASSESSMENT — PAIN DESCRIPTION - DESCRIPTORS: DESCRIPTORS: HEADACHE

## 2019-12-27 ASSESSMENT — PAIN DESCRIPTION - PAIN TYPE: TYPE: ACUTE PAIN

## 2020-01-12 ENCOUNTER — HOSPITAL ENCOUNTER (EMERGENCY)
Age: 25
Discharge: HOME | End: 2020-01-12
Attending: EMERGENCY MEDICINE
Payer: MEDICAID

## 2020-01-12 VITALS
SYSTOLIC BLOOD PRESSURE: 99 MMHG | TEMPERATURE: 97.9 F | HEART RATE: 78 BPM | RESPIRATION RATE: 18 BRPM | WEIGHT: 145 LBS | OXYGEN SATURATION: 99 % | DIASTOLIC BLOOD PRESSURE: 69 MMHG | BODY MASS INDEX: 19.64 KG/M2 | HEIGHT: 72 IN

## 2020-01-12 LAB
-: ABNORMAL
AMORPHOUS: ABNORMAL
BACTERIA: ABNORMAL
BILIRUBIN URINE: NEGATIVE
CASTS UA: ABNORMAL /LPF (ref 0–8)
COLOR: YELLOW
CRYSTALS, UA: ABNORMAL /HPF
EPITHELIAL CELLS UA: ABNORMAL /HPF (ref 0–5)
GLUCOSE URINE: NEGATIVE
KETONES, URINE: NEGATIVE
LEUKOCYTE ESTERASE, URINE: ABNORMAL
MUCUS: ABNORMAL
NITRITE, URINE: NEGATIVE
OTHER OBSERVATIONS UA: ABNORMAL
PH UA: 8.5 (ref 5–8)
PROTEIN UA: NEGATIVE
RBC UA: ABNORMAL /HPF (ref 0–4)
RENAL EPITHELIAL, UA: ABNORMAL /HPF
SPECIFIC GRAVITY UA: 1.02 (ref 1–1.03)
TRICHOMONAS: ABNORMAL
TURBIDITY: ABNORMAL
URINE HGB: NEGATIVE
UROBILINOGEN, URINE: NORMAL
WBC UA: ABNORMAL /HPF (ref 0–5)
YEAST: ABNORMAL

## 2020-01-12 PROCEDURE — 87491 CHLMYD TRACH DNA AMP PROBE: CPT

## 2020-01-12 PROCEDURE — 6360000002 HC RX W HCPCS: Performed by: STUDENT IN AN ORGANIZED HEALTH CARE EDUCATION/TRAINING PROGRAM

## 2020-01-12 PROCEDURE — 87591 N.GONORRHOEAE DNA AMP PROB: CPT

## 2020-01-12 PROCEDURE — 99283 EMERGENCY DEPT VISIT LOW MDM: CPT

## 2020-01-12 PROCEDURE — 87661 TRICHOMONAS VAGINALIS AMPLIF: CPT

## 2020-01-12 PROCEDURE — 6370000000 HC RX 637 (ALT 250 FOR IP): Performed by: STUDENT IN AN ORGANIZED HEALTH CARE EDUCATION/TRAINING PROGRAM

## 2020-01-12 PROCEDURE — 81001 URINALYSIS AUTO W/SCOPE: CPT

## 2020-01-12 PROCEDURE — 96372 THER/PROPH/DIAG INJ SC/IM: CPT

## 2020-01-12 RX ORDER — AZITHROMYCIN 250 MG/1
1000 TABLET, FILM COATED ORAL ONCE
Status: COMPLETED | OUTPATIENT
Start: 2020-01-12 | End: 2020-01-12

## 2020-01-12 RX ORDER — CEFTRIAXONE SODIUM 250 MG/1
250 INJECTION, POWDER, FOR SOLUTION INTRAMUSCULAR; INTRAVENOUS ONCE
Status: COMPLETED | OUTPATIENT
Start: 2020-01-12 | End: 2020-01-12

## 2020-01-12 RX ADMIN — CEFTRIAXONE SODIUM 250 MG: 250 INJECTION, POWDER, FOR SOLUTION INTRAMUSCULAR; INTRAVENOUS at 12:45

## 2020-01-12 RX ADMIN — AZITHROMYCIN 1000 MG: 250 TABLET, FILM COATED ORAL at 12:44

## 2020-01-12 ASSESSMENT — ENCOUNTER SYMPTOMS
ABDOMINAL PAIN: 0
SORE THROAT: 0
COUGH: 0
RECTAL PAIN: 0
BLOOD IN STOOL: 0
SHORTNESS OF BREATH: 0

## 2020-01-12 ASSESSMENT — PAIN DESCRIPTION - LOCATION: LOCATION: PENIS

## 2020-01-12 ASSESSMENT — PAIN DESCRIPTION - PAIN TYPE: TYPE: ACUTE PAIN

## 2020-01-12 ASSESSMENT — PAIN DESCRIPTION - DESCRIPTORS: DESCRIPTORS: DISCOMFORT

## 2020-01-12 ASSESSMENT — PAIN SCALES - GENERAL: PAINLEVEL_OUTOF10: 7

## 2020-01-12 NOTE — ED PROVIDER NOTES
education level: Not on file   Occupational History    Not on file   Social Needs    Financial resource strain: Not on file    Food insecurity:     Worry: Not on file     Inability: Not on file    Transportation needs:     Medical: Not on file     Non-medical: Not on file   Tobacco Use    Smoking status: Current Every Day Smoker     Types: Cigarettes    Smokeless tobacco: Never Used   Substance and Sexual Activity    Alcohol use: Yes     Comment: Socially    Drug use: No    Sexual activity: Yes     Partners: Female   Lifestyle    Physical activity:     Days per week: Not on file     Minutes per session: Not on file    Stress: Not on file   Relationships    Social connections:     Talks on phone: Not on file     Gets together: Not on file     Attends Latter-day service: Not on file     Active member of club or organization: Not on file     Attends meetings of clubs or organizations: Not on file     Relationship status: Not on file    Intimate partner violence:     Fear of current or ex partner: Not on file     Emotionally abused: Not on file     Physically abused: Not on file     Forced sexual activity: Not on file   Other Topics Concern    Not on file   Social History Narrative    Not on file         History reviewed. No pertinent family history. Portions of the past medical history, past surgical history, social history, and family history were discussed and reviewed with thepatient/family and is included in HPI if pertinent. ALLERGIES / IMMUNIZATIONS / HOME MEDICATIONS     Allergies: Patient has no known allergies.     IMMUNIZATIONS    Immunization History   Administered Date(s) Administered    DTaP 1996, 1996, 1996, 02/10/1998, 05/18/2001    DTaP (Infanrix) 1996, 1996, 1996, 02/10/1998, 05/18/2001    HPV Quadrivalent (Gardasil) 11/05/2010, 08/12/2011    Hepatitis B 1996, 1996, 02/10/1998    Hepatitis B vaccine 1996, 1996,

## 2020-01-12 NOTE — ED NOTES
Pt presents to ED w/ c/o penile discharge and dysuria which pt states started on Friday and pt rated pain at 7/10. Pt is A&OX4.      Nima Mariscal RN  01/12/20 2101

## 2020-01-13 LAB
C. TRACHOMATIS DNA ,URINE: NEGATIVE
N. GONORRHOEAE DNA, URINE: ABNORMAL
SOURCE: NORMAL
SPECIMEN DESCRIPTION: ABNORMAL
TRICHOMONAS VAGINALI, MOLECULAR: NEGATIVE

## 2020-01-14 ENCOUNTER — TELEPHONE (OUTPATIENT)
Dept: PHARMACY | Age: 25
End: 2020-01-14

## 2020-01-15 ENCOUNTER — OFFICE VISIT (OUTPATIENT)
Dept: FAMILY MEDICINE CLINIC | Age: 25
End: 2020-01-15
Payer: MEDICAID

## 2020-01-15 VITALS
HEIGHT: 72 IN | DIASTOLIC BLOOD PRESSURE: 61 MMHG | WEIGHT: 147 LBS | BODY MASS INDEX: 19.91 KG/M2 | SYSTOLIC BLOOD PRESSURE: 105 MMHG

## 2020-01-15 PROCEDURE — 99211 OFF/OP EST MAY X REQ PHY/QHP: CPT | Performed by: FAMILY MEDICINE

## 2020-01-15 PROCEDURE — 99213 OFFICE O/P EST LOW 20 MIN: CPT | Performed by: STUDENT IN AN ORGANIZED HEALTH CARE EDUCATION/TRAINING PROGRAM

## 2020-01-15 PROCEDURE — G8484 FLU IMMUNIZE NO ADMIN: HCPCS | Performed by: STUDENT IN AN ORGANIZED HEALTH CARE EDUCATION/TRAINING PROGRAM

## 2020-01-15 PROCEDURE — 4004F PT TOBACCO SCREEN RCVD TLK: CPT | Performed by: STUDENT IN AN ORGANIZED HEALTH CARE EDUCATION/TRAINING PROGRAM

## 2020-01-15 PROCEDURE — G8427 DOCREV CUR MEDS BY ELIG CLIN: HCPCS | Performed by: STUDENT IN AN ORGANIZED HEALTH CARE EDUCATION/TRAINING PROGRAM

## 2020-01-15 PROCEDURE — G8420 CALC BMI NORM PARAMETERS: HCPCS | Performed by: STUDENT IN AN ORGANIZED HEALTH CARE EDUCATION/TRAINING PROGRAM

## 2020-01-15 ASSESSMENT — PATIENT HEALTH QUESTIONNAIRE - PHQ9
1. LITTLE INTEREST OR PLEASURE IN DOING THINGS: 0
SUM OF ALL RESPONSES TO PHQ QUESTIONS 1-9: 0
SUM OF ALL RESPONSES TO PHQ QUESTIONS 1-9: 0
SUM OF ALL RESPONSES TO PHQ9 QUESTIONS 1 & 2: 0
2. FEELING DOWN, DEPRESSED OR HOPELESS: 0

## 2020-01-15 ASSESSMENT — ENCOUNTER SYMPTOMS: ABDOMINAL PAIN: 0

## 2020-10-15 ENCOUNTER — OFFICE VISIT (OUTPATIENT)
Dept: FAMILY MEDICINE CLINIC | Age: 25
End: 2020-10-15
Payer: MEDICAID

## 2020-10-15 VITALS
SYSTOLIC BLOOD PRESSURE: 108 MMHG | TEMPERATURE: 97.1 F | DIASTOLIC BLOOD PRESSURE: 64 MMHG | HEIGHT: 73 IN | HEART RATE: 71 BPM | BODY MASS INDEX: 19.22 KG/M2 | WEIGHT: 145 LBS

## 2020-10-15 PROCEDURE — 99213 OFFICE O/P EST LOW 20 MIN: CPT | Performed by: STUDENT IN AN ORGANIZED HEALTH CARE EDUCATION/TRAINING PROGRAM

## 2020-10-15 RX ORDER — IBUPROFEN 800 MG/1
800 TABLET ORAL EVERY 8 HOURS PRN
Qty: 30 TABLET | Refills: 0 | Status: SHIPPED | OUTPATIENT
Start: 2020-10-15 | End: 2021-03-11 | Stop reason: ALTCHOICE

## 2020-10-15 RX ORDER — LIDOCAINE 4 G/G
1 PATCH TOPICAL DAILY
Qty: 30 PATCH | Refills: 0 | Status: SHIPPED | OUTPATIENT
Start: 2020-10-15 | End: 2020-11-14

## 2020-10-15 ASSESSMENT — PATIENT HEALTH QUESTIONNAIRE - PHQ9
1. LITTLE INTEREST OR PLEASURE IN DOING THINGS: 0
SUM OF ALL RESPONSES TO PHQ QUESTIONS 1-9: 0
2. FEELING DOWN, DEPRESSED OR HOPELESS: 0
SUM OF ALL RESPONSES TO PHQ QUESTIONS 1-9: 0
SUM OF ALL RESPONSES TO PHQ QUESTIONS 1-9: 0
SUM OF ALL RESPONSES TO PHQ9 QUESTIONS 1 & 2: 0

## 2020-10-15 ASSESSMENT — ENCOUNTER SYMPTOMS
PHOTOPHOBIA: 0
EYE PAIN: 0
ABDOMINAL PAIN: 0
VOMITING: 0
SORE THROAT: 0
COUGH: 0
NAUSEA: 0
BACK PAIN: 1
SHORTNESS OF BREATH: 0
RHINORRHEA: 0
WHEEZING: 0
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
## 2020-10-15 NOTE — PROGRESS NOTES
Visit Information    Have you changed or started any medications since your last visit including any over-the-counter medicines, vitamins, or herbal medicines? no   Have you stopped taking any of your medications? Is so, why? -  no  Are you having any side effects from any of your medications? - no    Have you seen any other physician or provider since your last visit?  no   Have you had any other diagnostic tests since your last visit?  no   Have you been seen in the emergency room and/or had an admission in a hospital since we last saw you?  no   Have you had your routine dental cleaning in the past 6 months?  no     Do you have an active MyChart account? If no, what is the barrier?   Yes    Patient Care Team:  Binta Le MD as PCP - General (Family Medicine)    Medical History Review  Past Medical, Family, and Social History reviewed and does not contribute to the patient presenting condition    Health Maintenance   Topic Date Due    Varicella vaccine (1 of 2 - 2-dose childhood series) 04/11/1997    Pneumococcal 0-64 years Vaccine (1 of 1 - PPSV23) 04/11/2002    Flu vaccine (1) 09/01/2020    DTaP/Tdap/Td vaccine (8 - Td) 09/17/2028    Hepatitis B vaccine  Completed    Hib vaccine  Completed    HPV vaccine  Completed    HIV screen  Completed    Hepatitis A vaccine  Aged Out    Meningococcal (ACWY) vaccine  Aged Out

## 2020-10-16 ENCOUNTER — TELEPHONE (OUTPATIENT)
Dept: FAMILY MEDICINE CLINIC | Age: 25
End: 2020-10-16

## 2020-10-21 RX ORDER — LIDOCAINE 50 MG/G
OINTMENT TOPICAL
Status: CANCELLED | OUTPATIENT
Start: 2020-10-21

## 2020-10-22 RX ORDER — LIDOCAINE 40 MG/G
CREAM TOPICAL
Qty: 1 TUBE | Refills: 0 | Status: SHIPPED | OUTPATIENT
Start: 2020-10-22 | End: 2021-07-31

## 2020-10-22 RX ORDER — LIDOCAINE 50 MG/G
1 PATCH TOPICAL DAILY
Qty: 30 PATCH | Refills: 0 | Status: SHIPPED | OUTPATIENT
Start: 2020-10-22 | End: 2020-11-21

## 2020-11-19 ENCOUNTER — APPOINTMENT (OUTPATIENT)
Dept: CT IMAGING | Age: 25
End: 2020-11-19
Payer: MEDICAID

## 2020-11-19 ENCOUNTER — HOSPITAL ENCOUNTER (EMERGENCY)
Age: 25
Discharge: HOME | End: 2020-11-19
Attending: EMERGENCY MEDICINE
Payer: MEDICAID

## 2020-11-19 ENCOUNTER — APPOINTMENT (OUTPATIENT)
Dept: GENERAL RADIOLOGY | Age: 25
End: 2020-11-19
Payer: MEDICAID

## 2020-11-19 ENCOUNTER — TELEPHONE (OUTPATIENT)
Dept: FAMILY MEDICINE CLINIC | Age: 25
End: 2020-11-19

## 2020-11-19 VITALS
RESPIRATION RATE: 18 BRPM | BODY MASS INDEX: 17.81 KG/M2 | WEIGHT: 135 LBS | DIASTOLIC BLOOD PRESSURE: 84 MMHG | SYSTOLIC BLOOD PRESSURE: 125 MMHG | OXYGEN SATURATION: 98 % | HEART RATE: 86 BPM | TEMPERATURE: 98.4 F

## 2020-11-19 PROCEDURE — 12002 RPR S/N/AX/GEN/TRNK2.6-7.5CM: CPT

## 2020-11-19 PROCEDURE — 73130 X-RAY EXAM OF HAND: CPT

## 2020-11-19 PROCEDURE — 70450 CT HEAD/BRAIN W/O DYE: CPT

## 2020-11-19 PROCEDURE — 6370000000 HC RX 637 (ALT 250 FOR IP): Performed by: STUDENT IN AN ORGANIZED HEALTH CARE EDUCATION/TRAINING PROGRAM

## 2020-11-19 PROCEDURE — 99285 EMERGENCY DEPT VISIT HI MDM: CPT

## 2020-11-19 RX ORDER — ACETAMINOPHEN 500 MG
1000 TABLET ORAL ONCE
Status: COMPLETED | OUTPATIENT
Start: 2020-11-19 | End: 2020-11-19

## 2020-11-19 RX ADMIN — ACETAMINOPHEN 1000 MG: 500 TABLET ORAL at 00:45

## 2020-11-19 ASSESSMENT — PAIN DESCRIPTION - FREQUENCY: FREQUENCY: CONTINUOUS

## 2020-11-19 ASSESSMENT — PAIN DESCRIPTION - PROGRESSION: CLINICAL_PROGRESSION: GRADUALLY WORSENING

## 2020-11-19 ASSESSMENT — ENCOUNTER SYMPTOMS
ABDOMINAL PAIN: 0
SHORTNESS OF BREATH: 0

## 2020-11-19 ASSESSMENT — PAIN DESCRIPTION - LOCATION: LOCATION: HEAD

## 2020-11-19 ASSESSMENT — PAIN DESCRIPTION - ONSET: ONSET: SUDDEN

## 2020-11-19 ASSESSMENT — PAIN DESCRIPTION - PAIN TYPE: TYPE: ACUTE PAIN

## 2020-11-19 ASSESSMENT — PAIN DESCRIPTION - DESCRIPTORS: DESCRIPTORS: ACHING;THROBBING

## 2020-11-19 ASSESSMENT — PAIN DESCRIPTION - ORIENTATION: ORIENTATION: MID

## 2020-11-19 ASSESSMENT — PAIN SCALES - GENERAL
PAINLEVEL_OUTOF10: 10
PAINLEVEL_OUTOF10: 10

## 2020-11-19 NOTE — PROCEDURES
PROCEDURE NOTE - LACERATION CLOSURE    PATIENT NAME: Russ Jacobson Sr. MEDICAL RECORD NO. [id number]  DATE: 11/19/2020  ATTENDING PHYSICIAN: Dr Hugh Chavez DIAGNOSIS: Laceration(s) as follows:  -Location: Posterior Scalp  -Length: 4 cm  -Layered closure: No    POSTOPERATIVE DIAGNOSIS:  Same  PROCEDURE PERFORMED:  Suture closure of laceration  PERFORMING PHYSICIAN: Joselyn Bolden DO  ANESTHESIA: Pain ease spray  ESTIMATED BLOOD LOSS:  Less than 25 ml. DISCUSSION:  Anita Allen is a 26y.o.-year-old male. Patient requires laceration repair. The history and physical examination were reviewed and confirmed. CONSENT: The patient provided verbal consent for this procedure. PROCEDURE:  Prior to starting, the procedure and patient were confirmed by those present. The wound area was irrigated with sterile saline and draped in a sterile fashion. The wound area was anesthetized with pain ease spray. The wound was explored with the following results No foreign bodies found. The wound was repaired with staples. All sponge, instrument and needle counts were correct at the completion of the procedure. The patient tolerated the procedure well.      SUTURE COUNT:  Staple count: 8    COMPLICATIONS:  None     Joselyn Bolden DO  2:36 AM, 11/19/20
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
## 2020-11-19 NOTE — ED NOTES
Bed: 02  Expected date: 11/19/20  Expected time: 12:06 AM  Means of arrival: [facility]  Comments:  Medic 164 Jm Kaur RN  11/19/20 [id number]
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
## 2020-11-24 ENCOUNTER — HOSPITAL ENCOUNTER (EMERGENCY)
Age: 25
Discharge: HOME | End: 2020-11-24
Attending: EMERGENCY MEDICINE
Payer: MEDICAID

## 2020-11-24 VITALS
RESPIRATION RATE: 15 BRPM | TEMPERATURE: 97 F | OXYGEN SATURATION: 100 % | SYSTOLIC BLOOD PRESSURE: 123 MMHG | DIASTOLIC BLOOD PRESSURE: 82 MMHG | HEART RATE: 71 BPM

## 2020-11-24 PROCEDURE — 99282 EMERGENCY DEPT VISIT SF MDM: CPT

## 2020-11-24 ASSESSMENT — PAIN DESCRIPTION - LOCATION: LOCATION: HEAD

## 2020-11-24 ASSESSMENT — PAIN SCALES - GENERAL: PAINLEVEL_OUTOF10: 10

## 2020-11-24 ASSESSMENT — ENCOUNTER SYMPTOMS
NAUSEA: 0
COUGH: 0
CHEST TIGHTNESS: 0
DIARRHEA: 0
ABDOMINAL PAIN: 0
BACK PAIN: 0
SHORTNESS OF BREATH: 0
VOMITING: 0
CONSTIPATION: 0
WHEEZING: 0

## 2020-11-24 ASSESSMENT — PAIN DESCRIPTION - DESCRIPTORS: DESCRIPTORS: ACHING;DISCOMFORT

## 2020-11-24 ASSESSMENT — PAIN DESCRIPTION - PAIN TYPE: TYPE: ACUTE PAIN

## 2020-11-24 NOTE — ED PROVIDER NOTES
[facility]     Emergency Department     Faculty Attestation    I performed a history and physical examination of the patient and discussed management with the resident. I reviewed the residents note and agree with the documented findings and plan of care. Any areas of disagreement are noted on the chart. I was personally present for the key portions of any procedures. I have documented in the chart those procedures where I was not present during the key portions. I have reviewed the emergency nurses triage note. I agree with the chief complaint, past medical history, past surgical history, allergies, medications, social and family history as documented unless otherwise noted below. For Physician Assistant/ Nurse Practitioner cases/documentation I have personally evaluated this patient and have completed at least one if not all key elements of the E/M (history, physical exam, and MDM). Additional findings are as noted. Patient presents complaining of pain to the area where he had staples placed to his scalp 5 days ago. Patient denies headache, fever, chills. He has no other complaints. He says that he has been taking Tylenol and Motrin for the pain but has not been helping. On exam, patient is sitting in a chair and appears well. He is alert and oriented and answering questions appropriately. The wound appears to have healed well and is now well approximated. There is no erythema, swelling, drainage from the site. The resident will remove the staples. Will treat patient's pain with Tylenol and/or Motrin.       Honorio Monae MD  Attending Emergency  Physician              Theresa Moses MD  11/24/20 [id number]

## 2020-11-24 NOTE — ED PROVIDER NOTES
[facility] [place]  ED  Emergency Department Encounter  Emergency Medicine Resident     Pt Name: Wil Bhatt Sr. MRN: [id number]  Birthdate 1995  Date of evaluation: 11/24/20  PCP:  Ramila Alvarez MD    [address]       Chief Complaint   Patient presents with    Follow-up     pt seen here 11/19 and received 9 staples to his scalp and states no pain relief with Tylenol and Ibuprofen and is requesting something stronger for pain. Area appears to be healing well, no redness, swelling or drainage noted       HISTORY OFPRESENT ILLNESS  (Location/Symptom, Timing/Onset, Context/Setting, Quality, Duration, Modifying Factors,Severity.)      Gloria Chiu. is a 26 y.o. male who presents with head pain. Patient had staples placed in the posterior scalp approximately 5 days ago. Patient is taking Tylenol Motrin at home without significant pain relief. Patient asking for additional pain medications. No headache, states that when he lays down on them they hurt. Requesting narcotic pain medications. PAST MEDICAL / SURGICAL / SOCIAL / FAMILY HISTORY      has no past medical history on file. has no past surgical history on file.      Social History     Socioeconomic History    Marital status: Single     Spouse name: Not on file    Number of children: Not on file    Years of education: Not on file    Highest education level: Not on file   Occupational History    Not on file   Social Needs    Financial resource strain: Not on file    Food insecurity     Worry: Not on file     Inability: Not on file    Transportation needs     Medical: Not on file     Non-medical: Not on file   Tobacco Use    Smoking status: Current Every Day Smoker     Packs/day: 1.00     Types: Cigarettes    Smokeless tobacco: Never Used   Substance and Sexual Activity    Alcohol use: Yes     Comment: Daily     Drug use: Yes     Types: Marijuana    Sexual activity: Yes     Partners: Female   Lifestyle    Physical activity     Days per week: Not on file     Minutes per session: Not on file    Stress: Not on file   Relationships    Social connections     Talks on phone: Not on file     Gets together: Not on file     Attends Rastafari service: Not on file     Active member of club or organization: Not on file     Attends meetings of clubs or organizations: Not on file     Relationship status: Not on file    Intimate partner violence     Fear of current or ex partner: Not on file     Emotionally abused: Not on file     Physically abused: Not on file     Forced sexual activity: Not on file   Other Topics Concern    Not on file   Social History Narrative    Not on file       History reviewed. No pertinent family history. Allergies:  Patient has no known allergies. Home Medications:  Prior to Admission medications    Medication Sig Start Date End Date Taking? Authorizing Provider   lidocaine (LMX) 4 % cream Apply topically as needed. 10/22/20   Kira Mcginnis MD   ibuprofen (ADVIL;MOTRIN) 800 MG tablet Take 1 tablet by mouth every 8 hours as needed for Pain 10/15/20   Alea Souza MD   Benzocaine-Menthol (CEPACOL) 6-10 MG LOZG lozenge Take 1 lozenge by mouth every 2 hours as needed for Sore Throat  Patient not taking: Reported on 1/15/2020 9/4/19   Pankaj Sanchez,        REVIEW OFSYSTEMS    (2-9 systems for level 4, 10 or more for level 5)      Review of Systems   Constitutional: Negative for chills, diaphoresis, fatigue and fever. Respiratory: Negative for cough, chest tightness, shortness of breath and wheezing. Cardiovascular: Negative for chest pain, palpitations and leg swelling. Gastrointestinal: Negative for abdominal pain, constipation, diarrhea, nausea and vomiting. Genitourinary: Negative for difficulty urinating, dysuria, flank pain and hematuria. Musculoskeletal: Negative for arthralgias, back pain, neck pain and neck stiffness.    Skin: Positive for wound (Staples on posterior scalp, bothersome to patient). Neurological: Negative for dizziness, weakness, light-headedness, numbness and headaches. PHYSICAL EXAM   (up to 7 for level 4, 8 or more forlevel 5)      INITIAL VITALS:   ED Triage Vitals   BP Temp Temp Source Pulse Resp SpO2 Height Weight   11/24/20 1039 11/24/20 1027 11/24/20 1027 11/24/20 1039 11/24/20 1039 11/24/20 1039 -- --   123/82 97 °F (36.1 °C) Skin 71 15 100 %         Physical Exam  Vitals signs and nursing note reviewed. Constitutional:       General: He is not in acute distress. Appearance: He is well-developed. He is not diaphoretic. HENT:      Head: Normocephalic and atraumatic. Eyes:      Conjunctiva/sclera: Conjunctivae normal.      Pupils: Pupils are equal, round, and reactive to light. Neck:      Musculoskeletal: Normal range of motion and neck supple. Vascular: No JVD. Trachea: No tracheal deviation. Cardiovascular:      Rate and Rhythm: Normal rate and regular rhythm. Heart sounds: Normal heart sounds. No murmur. No friction rub. Pulmonary:      Effort: Pulmonary effort is normal. No respiratory distress. Breath sounds: Normal breath sounds. No wheezing or rales. Chest:      Chest wall: No tenderness. Abdominal:      General: Bowel sounds are normal. There is no distension. Palpations: Abdomen is soft. Tenderness: There is no abdominal tenderness. There is no guarding. Skin:     General: Skin is warm and dry. Capillary Refill: Capillary refill takes less than 2 seconds. Coloration: Skin is not pale. Findings: Lesion (Well-healing laceration of the posterior scalp, 9 staples in place.) present. No erythema or rash. Neurological:      Mental Status: He is alert and oriented to person, place, and time. Cranial Nerves: No cranial nerve deficit.    Psychiatric:         Behavior: Behavior normal.         DIFFERENTIAL  DIAGNOSIS     PLAN (LABS / IMAGING / EKG):  No orders of the defined types were placed in this encounter. MEDICATIONS ORDERED:  No orders of the defined types were placed in this encounter. DDX: Encounter for staple removal, pain    Initial MDM/Plan: 26 y.o. male who presents with pain from staples when he lays down. Requesting narcotic pain medications. Discussed this and appropriate. Laceration well-healed. Will remove staples and discharged with follow-up. DIAGNOSTIC RESULTS / EMERGENCYDEPARTMENT COURSE / MDM     LABS:  Labs Reviewed - No data to display      RADIOLOGY:  No results found. EMERGENCY DEPARTMENT COURSE:    Staples removed at bedside. Tolerated well. Will discharge. PROCEDURES:  PROCEDURE NOTE - SUTURE/STAPLE REMOVAL    PATIENT NAME: Cristian Dao Sr. MEDICAL RECORD NO. [id number]  DATE: 11/24/2020  ATTENDING PHYSICIAN: Dr. Daniella Paiz DIAGNOSIS: Wound healed  POSTOPERATIVE DIAGNOSIS:  Same  PROCEDURE PERFORMED:   Suture removal  PERFORMING PHYSICIAN: Jose Chinchilla DO      DISCUSSION:  Cristian Dao Sr. is a 26y.o.-year-old male who requires staples (s) due to Wound healed. The history and physical examination were reviewed and confirmed. CONSENT: The patient provided verbal consent for this procedure. PROCEDURE:  The patient was placed in the appropriate position and 9 staples were removed without difficulty. The patient tolerated the procedure well. COMPLICATIONS:   None     Jose Chinchilla DO  11:23 AM, 11/24/20                   CONSULTS:  None    CRITICAL CARE:  Please see attending note    FINAL IMPRESSION      1.  Encounter for staple removal          DISPOSITION / PLAN     DISPOSITION Decision To Discharge 11/24/2020 10:48:23 AM      PATIENT REFERRED TO:  Germaine Cagle MD  [facility] [address] [facility]    Go in 1 week      [facility]  [address] [id number] [phone]  Go to   If symptoms worsen      DISCHARGE MEDICATIONS:  Discharge Medication List as of 11/24/2020 11:18 AM          Cristina Alcantar DO  Emergency Medicine Resident    (Please note that portions of this note were completed with a voice recognition program.Efforts were made to edit the dictations but occasionally words are mis-transcribed.)     Cristina Alcantar DO  Resident  11/24/20 1123

## 2021-03-11 ENCOUNTER — HOSPITAL ENCOUNTER (EMERGENCY)
Age: 26
Discharge: HOME | End: 2021-03-11
Attending: EMERGENCY MEDICINE
Payer: MEDICAID

## 2021-03-11 VITALS
BODY MASS INDEX: 19.22 KG/M2 | DIASTOLIC BLOOD PRESSURE: 74 MMHG | TEMPERATURE: 97.9 F | OXYGEN SATURATION: 100 % | SYSTOLIC BLOOD PRESSURE: 112 MMHG | RESPIRATION RATE: 18 BRPM | HEART RATE: 76 BPM | HEIGHT: 73 IN | WEIGHT: 145 LBS

## 2021-03-11 DIAGNOSIS — S01.112A LEFT EYELID LACERATION, INITIAL ENCOUNTER: Primary | ICD-10-CM

## 2021-03-11 DIAGNOSIS — M25.561 ACUTE PAIN OF RIGHT KNEE: ICD-10-CM

## 2021-03-11 PROCEDURE — 12011 RPR F/E/E/N/L/M 2.5 CM/<: CPT

## 2021-03-11 PROCEDURE — 6370000000 HC RX 637 (ALT 250 FOR IP): Performed by: STUDENT IN AN ORGANIZED HEALTH CARE EDUCATION/TRAINING PROGRAM

## 2021-03-11 PROCEDURE — 99284 EMERGENCY DEPT VISIT MOD MDM: CPT

## 2021-03-11 RX ORDER — IBUPROFEN 400 MG/1
600 TABLET ORAL ONCE
Status: COMPLETED | OUTPATIENT
Start: 2021-03-11 | End: 2021-03-11

## 2021-03-11 RX ORDER — IBUPROFEN 400 MG/1
400 TABLET ORAL EVERY 6 HOURS PRN
Qty: 21 TABLET | Refills: 0 | Status: SHIPPED | OUTPATIENT
Start: 2021-03-11 | End: 2021-07-31

## 2021-03-11 RX ADMIN — IBUPROFEN 600 MG: 400 TABLET, FILM COATED ORAL at 09:01

## 2021-03-11 ASSESSMENT — ENCOUNTER SYMPTOMS
FACIAL SWELLING: 1
GASTROINTESTINAL NEGATIVE: 1
BACK PAIN: 1
EYE REDNESS: 1
PHOTOPHOBIA: 0
EYE PAIN: 1
RESPIRATORY NEGATIVE: 1

## 2021-03-11 ASSESSMENT — PAIN - FUNCTIONAL ASSESSMENT: PAIN_FUNCTIONAL_ASSESSMENT: PREVENTS OR INTERFERES SOME ACTIVE ACTIVITIES AND ADLS

## 2021-03-11 ASSESSMENT — PAIN SCALES - GENERAL: PAINLEVEL_OUTOF10: 10

## 2021-03-11 ASSESSMENT — PAIN DESCRIPTION - PROGRESSION: CLINICAL_PROGRESSION: GRADUALLY WORSENING

## 2021-03-11 ASSESSMENT — PAIN DESCRIPTION - ONSET: ONSET: SUDDEN
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
## 2021-03-12 ENCOUNTER — TELEPHONE (OUTPATIENT)
Dept: FAMILY MEDICINE CLINIC | Age: 26
End: 2021-03-12

## 2021-07-23 ENCOUNTER — OFFICE VISIT (OUTPATIENT)
Dept: FAMILY MEDICINE CLINIC | Age: 26
End: 2021-07-23
Payer: MEDICAID

## 2021-07-23 VITALS
HEART RATE: 64 BPM | DIASTOLIC BLOOD PRESSURE: 79 MMHG | BODY MASS INDEX: 19.68 KG/M2 | WEIGHT: 149.2 LBS | SYSTOLIC BLOOD PRESSURE: 120 MMHG

## 2021-07-23 DIAGNOSIS — Z20.2 EXPOSURE TO STD: ICD-10-CM

## 2021-07-23 DIAGNOSIS — Z00.00 HEALTHCARE MAINTENANCE: ICD-10-CM

## 2021-07-23 DIAGNOSIS — F17.200 CURRENT SMOKER: Primary | ICD-10-CM

## 2021-07-23 PROCEDURE — G8427 DOCREV CUR MEDS BY ELIG CLIN: HCPCS | Performed by: STUDENT IN AN ORGANIZED HEALTH CARE EDUCATION/TRAINING PROGRAM

## 2021-07-23 PROCEDURE — G8420 CALC BMI NORM PARAMETERS: HCPCS | Performed by: STUDENT IN AN ORGANIZED HEALTH CARE EDUCATION/TRAINING PROGRAM

## 2021-07-23 PROCEDURE — 99213 OFFICE O/P EST LOW 20 MIN: CPT | Performed by: STUDENT IN AN ORGANIZED HEALTH CARE EDUCATION/TRAINING PROGRAM

## 2021-07-23 PROCEDURE — 4004F PT TOBACCO SCREEN RCVD TLK: CPT | Performed by: STUDENT IN AN ORGANIZED HEALTH CARE EDUCATION/TRAINING PROGRAM

## 2021-07-23 ASSESSMENT — PATIENT HEALTH QUESTIONNAIRE - PHQ9
SUM OF ALL RESPONSES TO PHQ9 QUESTIONS 1 & 2: 0
2. FEELING DOWN, DEPRESSED OR HOPELESS: 0
SUM OF ALL RESPONSES TO PHQ QUESTIONS 1-9: 0
SUM OF ALL RESPONSES TO PHQ QUESTIONS 1-9: 0
1. LITTLE INTEREST OR PLEASURE IN DOING THINGS: 0
SUM OF ALL RESPONSES TO PHQ QUESTIONS 1-9: 0

## 2021-07-23 ASSESSMENT — ENCOUNTER SYMPTOMS
ABDOMINAL PAIN: 0
CHEST TIGHTNESS: 0
SHORTNESS OF BREATH: 0

## 2021-07-23 NOTE — PROGRESS NOTES
I have reviewed and discussed key elements of [facility] with the resident including plan of care and follow up and agree with the care elizabeth plan.
Visit Information    Have you changed or started any medications since your last visit including any over-the-counter medicines, vitamins, or herbal medicines? no   Are you having any side effects from any of your medications? -  no  Have you stopped taking any of your medications? Is so, why? -  no    Have you seen any other physician or provider since your last visit? No  Have you had any other diagnostic tests since your last visit? No  Have you been seen in the emergency room and/or had an admission to a hospital since we last saw you? No  Have you had your routine dental cleaning in the past 6 months? no    Have you activated your [employer] account? If not, what are your barriers?  No:      Patient Care Team:  Izzy Garcia MD as PCP - General (Family Medicine)  Izzy Garcia MD as PCP - [address] [place] [address] [facility]    Medical History Review  Past Medical, Family, and Social History reviewed and does not contribute to the patient presenting condition    Health Maintenance   Topic Date Due    Hepatitis C screen  Never done    Varicella vaccine (1 of 2 - 2-dose childhood series) Never done    Pneumococcal 0-64 years Vaccine (1 of 2 - PPSV23) Never done    COVID-19 Vaccine (1) Never done    Flu vaccine (1) 09/01/2021    DTaP/Tdap/Td vaccine (8 - Td or Tdap) 09/17/2028    Hepatitis B vaccine  Completed    Hib vaccine  Completed    HPV vaccine  Completed    HIV screen  Completed    Hepatitis A vaccine  Aged Out    Meningococcal (ACWY) vaccine  Aged Out
patient on the importance of safe sexual practices    2. Healthcare maintenance  - Hepatitis C Antibody; Future          Requested Prescriptions      No prescriptions requested or ordered in this encounter       There are no discontinued medications. Return if symptoms worsen or fail to improve.

## 2021-07-31 PROBLEM — F17.200 CURRENT SMOKER: Status: ACTIVE | Noted: 2021-07-31

## 2021-08-22 PROBLEM — Z00.00 HEALTHCARE MAINTENANCE: Status: RESOLVED | Noted: 2021-07-23 | Resolved: 2021-08-22

## 2021-08-25 ENCOUNTER — HOSPITAL ENCOUNTER (OUTPATIENT)
Age: 26
Setting detail: SPECIMEN
Discharge: HOME | End: 2021-08-25
Payer: MEDICAID

## 2021-08-25 DIAGNOSIS — Z20.2 EXPOSURE TO STD: ICD-10-CM

## 2021-08-25 LAB
HEPATITIS C ANTIBODY: NONREACTIVE
T. PALLIDUM, IGG: NONREACTIVE

## 2021-08-26 LAB
C. TRACHOMATIS DNA ,URINE: NEGATIVE
HIV AG/AB: NONREACTIVE
N. GONORRHOEAE DNA, URINE: NEGATIVE
SPECIMEN DESCRIPTION: NORMAL

## 2021-09-27 ENCOUNTER — OFFICE VISIT (OUTPATIENT)
Dept: FAMILY MEDICINE CLINIC | Age: 26
End: 2021-09-27
Payer: MEDICAID

## 2021-09-27 VITALS
HEART RATE: 88 BPM | DIASTOLIC BLOOD PRESSURE: 79 MMHG | SYSTOLIC BLOOD PRESSURE: 120 MMHG | BODY MASS INDEX: 19.71 KG/M2 | WEIGHT: 149.4 LBS

## 2021-09-27 DIAGNOSIS — Z20.2 EXPOSURE TO STD: Primary | ICD-10-CM

## 2021-09-27 PROCEDURE — 4004F PT TOBACCO SCREEN RCVD TLK: CPT | Performed by: STUDENT IN AN ORGANIZED HEALTH CARE EDUCATION/TRAINING PROGRAM

## 2021-09-27 PROCEDURE — G8420 CALC BMI NORM PARAMETERS: HCPCS | Performed by: STUDENT IN AN ORGANIZED HEALTH CARE EDUCATION/TRAINING PROGRAM

## 2021-09-27 PROCEDURE — G8427 DOCREV CUR MEDS BY ELIG CLIN: HCPCS | Performed by: STUDENT IN AN ORGANIZED HEALTH CARE EDUCATION/TRAINING PROGRAM

## 2021-09-27 PROCEDURE — 6360000002 HC RX W HCPCS

## 2021-09-27 PROCEDURE — 99213 OFFICE O/P EST LOW 20 MIN: CPT | Performed by: STUDENT IN AN ORGANIZED HEALTH CARE EDUCATION/TRAINING PROGRAM

## 2021-09-27 RX ORDER — AZITHROMYCIN 500 MG/1
1000 TABLET, FILM COATED ORAL ONCE
Qty: 2 TABLET | Refills: 0 | Status: SHIPPED | OUTPATIENT
Start: 2021-09-27 | End: 2022-01-25 | Stop reason: SDUPTHER

## 2021-09-27 RX ORDER — CEFTRIAXONE SODIUM 250 MG/1
250 INJECTION, POWDER, FOR SOLUTION INTRAMUSCULAR; INTRAVENOUS ONCE
Status: COMPLETED | OUTPATIENT
Start: 2021-09-27 | End: 2021-09-27

## 2021-09-27 RX ADMIN — CEFTRIAXONE SODIUM 250 MG: 250 INJECTION, POWDER, FOR SOLUTION INTRAMUSCULAR; INTRAVENOUS at 11:16

## 2021-09-27 ASSESSMENT — ENCOUNTER SYMPTOMS
SINUS PAIN: 0
SHORTNESS OF BREATH: 0
VOMITING: 0
WHEEZING: 0
SINUS PRESSURE: 0
COUGH: 0
BLOOD IN STOOL: 0
BACK PAIN: 0
COLOR CHANGE: 0
CONSTIPATION: 0
ABDOMINAL PAIN: 0
DIARRHEA: 0
NAUSEA: 0

## 2021-09-27 NOTE — PROGRESS NOTES
Subjective:    Sanchez Hughes is a 23 y.o. male with  has no past medical history on file. Presented to the office today for:  Chief Complaint   Patient presents with    Check-Up     discharge       HPI  This is a 76-year-old male with no significant past medical history came in today complains of urethral discharge and testicular pain. Cc: Urethral discharge and testicular pain  Patient endorses having unprotected sex almost 10 days ago and for the last 3 days he is having urethral discharge and testicular pain. Patient endorses dull pain in bilateral testes, aggravated with erection, no relieving factor was mentioned, not associated with lumps and bumps, rash, fever, chills. Symptoms of increased frequency and urgency of urination. Patient has previous history of STDs mostly GC and was treated with injection-pills. Review of Systems   Constitutional: Negative for chills and fever. HENT: Negative for congestion, sinus pressure and sinus pain. Respiratory: Negative for cough, shortness of breath and wheezing. Cardiovascular: Negative for chest pain, palpitations and leg swelling. Gastrointestinal: Negative for abdominal pain, blood in stool, constipation, diarrhea, nausea and vomiting. Genitourinary: Positive for discharge and testicular pain. Negative for difficulty urinating, flank pain, hematuria, penile swelling and scrotal swelling. Musculoskeletal: Negative for arthralgias, back pain and myalgias. Skin: Negative for color change and wound. Neurological: Negative for dizziness, light-headedness and headaches. Psychiatric/Behavioral: Negative for agitation and confusion. The patient has a No family history on file.     Objective:    /79 (Site: Left Upper Arm, Position: Sitting, Cuff Size: Medium Adult)   Pulse 88   Wt 149 lb 6.4 oz (67.8 kg)   BMI 19.71 kg/m²    BP Readings from Last 3 Encounters:   09/27/21 120/79   07/23/21 120/79   03/11/21 112/74       Physical Exam  Vitals and nursing note reviewed. Constitutional:       Appearance: Normal appearance. HENT:      Head: Normocephalic and atraumatic. Mouth/Throat:      Mouth: Mucous membranes are moist.   Eyes:      Conjunctiva/sclera: Conjunctivae normal.   Cardiovascular:      Rate and Rhythm: Normal rate and regular rhythm. Pulmonary:      Effort: Pulmonary effort is normal.      Breath sounds: Normal breath sounds. Abdominal:      General: Bowel sounds are normal. There is no distension. Palpations: Abdomen is soft. There is no mass. Tenderness: There is no abdominal tenderness. There is no guarding. Musculoskeletal:      Right lower leg: No edema. Left lower leg: No edema. Skin:     Coloration: Skin is not pale. Neurological:      General: No focal deficit present. Mental Status: He is alert and oriented to person, place, and time. Psychiatric:         Mood and Affect: Mood normal.         Behavior: Behavior normal.         No results found for: WBC, HGB, HCT, PLT, CHOL, TRIG, HDL, LDLDIRECT, ALT, AST, NA, K, CL, CREATININE, BUN, CO2, TSH, PSA, INR, GLUF, LABA1C, LABMICR  No results found for: CALCIUM, PHOS  No results found for: LDLCALC, LDLCHOLESTEROL, LDLDIRECT    Assessment and Plan:    1. Exposure to STD  - Since the patient prefers treatment at this time, We will go ahead and treat the patient with azithromycin and Rocephin. Patient was also counseled total abstinence sex almost 1 week. - Zithromycin 1 g.  - cefTRIAXone (ROCEPHIN) injection 250 mg      If the symptoms does not change in or worsening patient was counseled to schedule another appointment for further evaluation, will consider GC, urine analysis at that time. Requested Prescriptions     Signed Prescriptions Disp Refills    azithromycin (ZITHROMAX) 500 MG tablet 2 tablet 0     Sig: Take 2 tablets by mouth once for 1 dose       There are no discontinued medications.     Raymon Saleh received counseling on the following healthy behaviors: nutrition, exercise and medication adherence    Discussed use,benefit, and side effects of prescribed medications. Barriers to medication compliance addressed. All patient questions answered. Pt voiced understanding. No follow-ups on file. Disclaimer: Some orall of this note was transcribed using voice-recognition software. This may cause typographical errors occasionally. Although all effort is made to fix these errors, please do not hesitate to contact our office if there Earl Ramirez concern with the understanding of this note.

## 2021-09-27 NOTE — PROGRESS NOTES
Visit Information    Have you changed or started any medications since your last visit including any over-the-counter medicines, vitamins, or herbal medicines? no   Are you having any side effects from any of your medications? -  no  Have you stopped taking any of your medications? Is so, why? -  no    Have you seen any other physician or provider since your last visit? No  Have you had any other diagnostic tests since your last visit? No  Have you been seen in the emergency room and/or had an admission to a hospital since we last saw you? No  Have you had your routine dental cleaning in the past 6 months? no    Have you activated your [employer] account? If not, what are your barriers?  No:      Patient Care Team:  Dara Juan MD as PCP - General (Family Medicine)  Dara Juan MD as PCP - [facility]    Medical History Review  Past Medical, Family, and Social History reviewed and does not contribute to the patient presenting condition    Health Maintenance   Topic Date Due    Varicella vaccine (1 of 2 - 2-dose childhood series) Never done    Pneumococcal 0-64 years Vaccine (1 of 2 - PPSV23) Never done    COVID-19 Vaccine (1) Never done    Flu vaccine (1) Never done    DTaP/Tdap/Td vaccine (8 - Td or Tdap) 09/17/2028    Hepatitis B vaccine  Completed    Hib vaccine  Completed    HPV vaccine  Completed    Hepatitis C screen  Completed    HIV screen  Completed    Hepatitis A vaccine  Aged Out    Meningococcal (ACWY) vaccine  Aged Out

## 2021-09-27 NOTE — PROGRESS NOTES
Attending Physician Statement  I have discussed the care of Goldie Brown 23 y.o. male, including pertinent history and exam findings, with the resident Dr. Satinder Herr MD.    History and Exam:   Chief Complaint   Patient presents with   [facility]     discharge       No past medical history on file. No Known Allergies   I have seen and examined the patient and the key elements of the encounter have been performed by me. BP Readings from Last 3 Encounters:   09/27/21 120/79   07/23/21 120/79   03/11/21 112/74     /79 (Site: Left Upper Arm, Position: Sitting, Cuff Size: Medium Adult)   Pulse 88   Wt 149 lb 6.4 oz (67.8 kg)   BMI 19.71 kg/m²   No results found for: WBC, HGB, HCT, PLT, CHOL, TRIG, HDL, LDLDIRECT, ALT, AST, NA, K, CL, CREATININE, BUN, CO2, TSH, PSA, INR, GLUF, LABA1C, LABMICR  No results found for: LABPROT, LABALBU  No results found for: IRON, TIBC, FERRITIN  No results found for: LDLCALC, LDLCHOLESTEROL, LDLDIRECT  I agree with the assessment, plan and the diagnosis of    Diagnosis Orders   1. Exposure to STD  cefTRIAXone (ROCEPHIN) injection 250 mg    azithromycin (ZITHROMAX) 500 MG tablet    . I agree with orders as documented by the resident. More than 25 minutes spent  in face to face encounter with the patient and more than half in counseling. Patient's questions were answered. Patient Voiced understanding to the counseling. Return in about 3 months (around 12/27/2021), or if symptoms worsen or fail to improve.    (GC Modifier)-Dr. Moshe Bernal MD

## 2021-09-27 NOTE — PATIENT INSTRUCTIONS
Thank you for letting us take care of you today. We hope all your questions were addressed. If a question was overlooked or something else comes to mind after you return home, please contact a member of your Care Team listed below. Your Care Team at [place] [address] is Team #5  Almaz Schafer MD (Faculty)  Tiburcio Dejesus MD (Resident)  Lakesha Obrien MD (Resident)  Sharon Orellana MD (Resident)  Dionna Gu MD (Resident)  Suzan Walters., CHANDRIKA Montes., ABEL Chanel., Ramirez Rojas., [facility] office)  Leonard Gore, [address] (Clinical Practice Manager)  Althea Cool, [facility] (Clinical Pharmacist)       Office phone number: [phone]    If you need to get in right away due to illness, please be advised we have \"Same Day\" appointments available Monday-Friday. Please call us at [phone] option #3 to schedule your \"Same Day\" appointment.

## 2022-01-24 ENCOUNTER — TELEPHONE (OUTPATIENT)
Dept: FAMILY MEDICINE CLINIC | Age: 27
End: 2022-01-24

## 2022-01-24 NOTE — TELEPHONE ENCOUNTER
Called the patient and gave him the telephone number of the office and his PCPs name to schedule an appointment with his PCP. Thanks. Taniya Mayorga.

## 2022-01-24 NOTE — TELEPHONE ENCOUNTER
Patient calling stating that he needs to talk to Dr Zoey Can immediately - he would not discuss with MA what was needed he only wants the physician to call him \"ASAP\" - I explained that Dr Zoey Can was not in office today but a message could be sent to reach out to him.

## 2022-01-25 DIAGNOSIS — Z20.2 EXPOSURE TO STD: ICD-10-CM

## 2022-01-25 RX ORDER — AZITHROMYCIN 500 MG/1
1000 TABLET, FILM COATED ORAL ONCE
Qty: 2 TABLET | Refills: 0 | Status: SHIPPED | OUTPATIENT
Start: 2022-01-25 | End: 2022-01-25

## 2022-01-28 ENCOUNTER — TELEPHONE (OUTPATIENT)
Dept: FAMILY MEDICINE CLINIC | Age: 27
End: 2022-01-28

## 2022-01-28 NOTE — TELEPHONE ENCOUNTER
Patient was calling in about medication flexeril, pt was last seen 09/2021, expalin would need appt. [facility] appt.  1/28/22

## 2022-01-29 ENCOUNTER — HOSPITAL ENCOUNTER (EMERGENCY)
Age: 27
Discharge: OTHER INSTITUTION | End: 2022-01-30
Attending: EMERGENCY MEDICINE
Payer: MEDICAID

## 2022-01-29 VITALS
SYSTOLIC BLOOD PRESSURE: 139 MMHG | BODY MASS INDEX: 19.48 KG/M2 | TEMPERATURE: 98.8 F | OXYGEN SATURATION: 98 % | HEIGHT: 73 IN | HEART RATE: 68 BPM | RESPIRATION RATE: 16 BRPM | WEIGHT: 147 LBS | DIASTOLIC BLOOD PRESSURE: 79 MMHG

## 2022-01-29 DIAGNOSIS — F10.10 ALCOHOL ABUSE: Primary | ICD-10-CM

## 2022-01-29 LAB
ABSOLUTE EOS #: 0 K/UL (ref 0–0.4)
ABSOLUTE IMMATURE GRANULOCYTE: ABNORMAL K/UL (ref 0–0.3)
ABSOLUTE LYMPH #: 2.23 K/UL (ref 1–4.8)
ABSOLUTE MONO #: 0.26 K/UL (ref 0.1–1.3)
ACETAMINOPHEN LEVEL: <5 UG/ML (ref 10–30)
ALBUMIN SERPL-MCNC: 4.8 G/DL (ref 3.5–5.2)
ALBUMIN/GLOBULIN RATIO: ABNORMAL (ref 1–2.5)
ALP BLD-CCNC: 77 U/L (ref 40–129)
ALT SERPL-CCNC: 20 U/L (ref 5–41)
AMPHETAMINE SCREEN URINE: NEGATIVE
ANION GAP SERPL CALCULATED.3IONS-SCNC: 13 MMOL/L (ref 9–17)
AST SERPL-CCNC: 36 U/L
BARBITURATE SCREEN URINE: NEGATIVE
BASOPHILS # BLD: 0 % (ref 0–2)
BASOPHILS ABSOLUTE: 0 K/UL (ref 0–0.2)
BENZODIAZEPINE SCREEN, URINE: NEGATIVE
BILIRUB SERPL-MCNC: 0.2 MG/DL (ref 0.3–1.2)
BUN BLDV-MCNC: 10 MG/DL (ref 6–20)
BUN/CREAT BLD: ABNORMAL (ref 9–20)
BUPRENORPHINE URINE: ABNORMAL
CALCIUM SERPL-MCNC: 9.3 MG/DL (ref 8.6–10.4)
CANNABINOID SCREEN URINE: POSITIVE
CHLORIDE BLD-SCNC: 107 MMOL/L (ref 98–107)
CO2: 22 MMOL/L (ref 20–31)
COCAINE METABOLITE, URINE: NEGATIVE
CREAT SERPL-MCNC: 0.83 MG/DL (ref 0.7–1.2)
DIFFERENTIAL TYPE: ABNORMAL
EOSINOPHILS RELATIVE PERCENT: 0 % (ref 0–4)
ETHANOL PERCENT: 0.16 %
ETHANOL: 158 MG/DL
GFR AFRICAN AMERICAN: >60 ML/MIN
GFR NON-AFRICAN AMERICAN: >60 ML/MIN
GFR SERPL CREATININE-BSD FRML MDRD: ABNORMAL ML/MIN/{1.73_M2}
GFR SERPL CREATININE-BSD FRML MDRD: ABNORMAL ML/MIN/{1.73_M2}
GLUCOSE BLD-MCNC: 78 MG/DL (ref 70–99)
HCT VFR BLD CALC: 44 % (ref 41–53)
HEMOGLOBIN: 15.5 G/DL (ref 13.5–17.5)
IMMATURE GRANULOCYTES: ABNORMAL %
LYMPHOCYTES # BLD: 52 % (ref 24–44)
MCH RBC QN AUTO: 32.8 PG (ref 26–34)
MCHC RBC AUTO-ENTMCNC: 35.3 G/DL (ref 31–37)
MCV RBC AUTO: 92.9 FL (ref 80–100)
MDMA URINE: ABNORMAL
METHADONE SCREEN, URINE: NEGATIVE
METHAMPHETAMINE, URINE: ABNORMAL
MONOCYTES # BLD: 6 % (ref 1–7)
MORPHOLOGY: NORMAL
NRBC AUTOMATED: ABNORMAL PER 100 WBC
OPIATES, URINE: NEGATIVE
OXYCODONE SCREEN URINE: NEGATIVE
PDW BLD-RTO: 12.6 % (ref 11.5–14.9)
PHENCYCLIDINE, URINE: NEGATIVE
PLATELET # BLD: 269 K/UL (ref 150–450)
PLATELET ESTIMATE: ABNORMAL
PMV BLD AUTO: 7.2 FL (ref 6–12)
POTASSIUM SERPL-SCNC: 3.8 MMOL/L (ref 3.7–5.3)
PROPOXYPHENE, URINE: ABNORMAL
RBC # BLD: 4.73 M/UL (ref 4.5–5.9)
RBC # BLD: ABNORMAL 10*6/UL
SALICYLATE LEVEL: <1 MG/DL (ref 3–10)
SARS-COV-2, RAPID: NOT DETECTED
SEG NEUTROPHILS: 42 % (ref 36–66)
SEGMENTED NEUTROPHILS ABSOLUTE COUNT: 1.81 K/UL (ref 1.3–9.1)
SODIUM BLD-SCNC: 142 MMOL/L (ref 135–144)
SPECIMEN DESCRIPTION: NORMAL
TEST INFORMATION: ABNORMAL
TOTAL PROTEIN: 7.6 G/DL (ref 6.4–8.3)
TRICYCLIC ANTIDEPRESSANTS, UR: ABNORMAL
WBC # BLD: 4.3 K/UL (ref 3.5–11)
WBC # BLD: ABNORMAL 10*3/UL

## 2022-01-29 PROCEDURE — 80307 DRUG TEST PRSMV CHEM ANLYZR: CPT

## 2022-01-29 PROCEDURE — G0480 DRUG TEST DEF 1-7 CLASSES: HCPCS

## 2022-01-29 PROCEDURE — 80053 COMPREHEN METABOLIC PANEL: CPT

## 2022-01-29 PROCEDURE — 80179 DRUG ASSAY SALICYLATE: CPT

## 2022-01-29 PROCEDURE — 80143 DRUG ASSAY ACETAMINOPHEN: CPT

## 2022-01-29 PROCEDURE — 36415 COLL VENOUS BLD VENIPUNCTURE: CPT

## 2022-01-29 PROCEDURE — 87635 SARS-COV-2 COVID-19 AMP PRB: CPT

## 2022-01-29 PROCEDURE — 99283 EMERGENCY DEPT VISIT LOW MDM: CPT

## 2022-01-29 PROCEDURE — 85025 COMPLETE CBC W/AUTO DIFF WBC: CPT

## 2022-01-29 ASSESSMENT — ENCOUNTER SYMPTOMS
ABDOMINAL PAIN: 0
DIARRHEA: 0
BACK PAIN: 1
SHORTNESS OF BREATH: 0
COUGH: 0
VOMITING: 0
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
## 2022-01-30 NOTE — ED NOTES
BROWN faxed over a referral over to [facility]. BROWN contacted [facility] and informed staff a referral has been faxed over. A staff member will contact BROWN back after the on call psychiatrist has been consulted.

## 2022-01-30 NOTE — ED NOTES
SW arranged pt transportation through [facility]ley and [employer] to [facility]. SW provided pt with pt's belongings and paperwork. Pt cooperatively exited the [facility].
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
## 2022-01-30 NOTE — ED NOTES
Blood and lab specimens collected, pt tolerated well; pt provided meal tray per request. Pt resting comfortably in recliner, no immediate distress or additional needs noted at this time.       Jordana Larsen RN  01/29/22 1502
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
## 2022-05-25 ENCOUNTER — OFFICE VISIT (OUTPATIENT)
Dept: FAMILY MEDICINE CLINIC | Age: 27
End: 2022-05-25
Payer: MEDICAID

## 2022-05-25 VITALS
BODY MASS INDEX: 19.53 KG/M2 | HEART RATE: 69 BPM | WEIGHT: 147 LBS | SYSTOLIC BLOOD PRESSURE: 106 MMHG | DIASTOLIC BLOOD PRESSURE: 61 MMHG

## 2022-05-25 DIAGNOSIS — G89.29 CHRONIC MIDLINE LOW BACK PAIN WITHOUT SCIATICA: Primary | ICD-10-CM

## 2022-05-25 DIAGNOSIS — M54.50 CHRONIC MIDLINE LOW BACK PAIN WITHOUT SCIATICA: Primary | ICD-10-CM

## 2022-05-25 PROCEDURE — G8427 DOCREV CUR MEDS BY ELIG CLIN: HCPCS | Performed by: STUDENT IN AN ORGANIZED HEALTH CARE EDUCATION/TRAINING PROGRAM

## 2022-05-25 PROCEDURE — G8420 CALC BMI NORM PARAMETERS: HCPCS | Performed by: STUDENT IN AN ORGANIZED HEALTH CARE EDUCATION/TRAINING PROGRAM

## 2022-05-25 PROCEDURE — 4004F PT TOBACCO SCREEN RCVD TLK: CPT | Performed by: STUDENT IN AN ORGANIZED HEALTH CARE EDUCATION/TRAINING PROGRAM

## 2022-05-25 PROCEDURE — 99213 OFFICE O/P EST LOW 20 MIN: CPT | Performed by: STUDENT IN AN ORGANIZED HEALTH CARE EDUCATION/TRAINING PROGRAM

## 2022-05-25 ASSESSMENT — PATIENT HEALTH QUESTIONNAIRE - PHQ9
SUM OF ALL RESPONSES TO PHQ9 QUESTIONS 1 & 2: 0
8. MOVING OR SPEAKING SO SLOWLY THAT OTHER PEOPLE COULD HAVE NOTICED. OR THE OPPOSITE, BEING SO FIGETY OR RESTLESS THAT YOU HAVE BEEN MOVING AROUND A LOT MORE THAN USUAL: 0
5. POOR APPETITE OR OVEREATING: 0
SUM OF ALL RESPONSES TO PHQ QUESTIONS 1-9: 0
SUM OF ALL RESPONSES TO PHQ QUESTIONS 1-9: 0
3. TROUBLE FALLING OR STAYING ASLEEP: 0
SUM OF ALL RESPONSES TO PHQ QUESTIONS 1-9: 0
2. FEELING DOWN, DEPRESSED OR HOPELESS: 0
1. LITTLE INTEREST OR PLEASURE IN DOING THINGS: 0
9. THOUGHTS THAT YOU WOULD BE BETTER OFF DEAD, OR OF HURTING YOURSELF: 0
10. IF YOU CHECKED OFF ANY PROBLEMS, HOW DIFFICULT HAVE THESE PROBLEMS MADE IT FOR YOU TO DO YOUR WORK, TAKE CARE OF THINGS AT HOME, OR GET ALONG WITH OTHER PEOPLE: 0
4. FEELING TIRED OR HAVING LITTLE ENERGY: 0
SUM OF ALL RESPONSES TO PHQ QUESTIONS 1-9: 0
7. TROUBLE CONCENTRATING ON THINGS, SUCH AS READING THE NEWSPAPER OR WATCHING TELEVISION: 0
6. FEELING BAD ABOUT YOURSELF - OR THAT YOU ARE A FAILURE OR HAVE LET YOURSELF OR YOUR FAMILY DOWN: 0

## 2022-05-25 NOTE — PROGRESS NOTES
Subjective:    Brayden Jose is a 33 y.o. male with  has no past medical history on file. History reviewed. No pertinent family history. Presented tothe office today for:  Chief Complaint   Patient presents with    Scoliosis     follow up, requesting Xray for back       HPI    Review of Systems    Objective:    /61 (Site: Left Upper Arm, Position: Sitting, Cuff Size: Medium Adult)   Pulse 69   Wt 147 lb (66.7 kg)   BMI 19.53 kg/m²    BP Readings from Last 3 Encounters:   05/25/22 106/61   01/29/22 139/79   09/27/21 120/79       Physical Exam    Lab Results   Component Value Date    WBC 4.3 01/29/2022    HGB 15.5 01/29/2022    HCT 44.0 01/29/2022     01/29/2022    ALT 20 01/29/2022    AST 36 01/29/2022     01/29/2022    K 3.8 01/29/2022     01/29/2022    CREATININE 0.83 01/29/2022    BUN 10 01/29/2022    CO2 22 01/29/2022     Lab Results   Component Value Date    CALCIUM 9.3 01/29/2022     No results found for: LDLCALC, LDLCHOLESTEROL, LDLDIRECT    Assessment and Plan:    There are no diagnoses linked to this encounter. Requested Prescriptions      No prescriptions requested or ordered in this encounter       There are no discontinued medications. Chio Sainz received counseling on the following healthy behaviors:nutrition, exercise and medication adherence    Discussed use, benefit, and side effects of prescribed medications. Barriers to medication compliance addressed. All patient questions answered. Pt voicedunderstanding. No follow-ups on file.

## 2022-05-25 NOTE — PROGRESS NOTES
Visit Information    Have you changed or started any medications since your last visit including any over-the-counter medicines, vitamins, or herbal medicines? no   Have you stopped taking any of your medications? Is so, why? -  no  Are you having any side effects from any of your medications? - no    Have you seen any other physician or provider since your last visit?  no   Have you had any other diagnostic tests since your last visit?  no   Have you been seen in the emergency room and/or had an admission in a hospital since we last saw you?  no   Have you had your routine dental cleaning in the past 6 months?  no     Do you have an active MyChart account? If no, what is the barrier?   Yes    Patient Care Team:  Claudene Pointer, MD as PCP - General (Family Medicine)  Claudene Pointer, MD as PCP - [facility]    Medical History Review  Past Medical, Family, and Social History reviewed and does not contribute to the patient presenting condition    Health Maintenance   Topic Date Due    Varicella vaccine (1 of 2 - 2-dose childhood series) Never done    COVID-19 Vaccine (1) Never done    Pneumococcal 0-64 years Vaccine (1 - PCV) Never done    Depression Screen  07/23/2022    Flu vaccine (Season Ended) 09/01/2022    DTaP/Tdap/Td vaccine (8 - Td or Tdap) 09/17/2028    Hepatitis B vaccine  Completed    Hib vaccine  Completed    HPV vaccine  Completed    Hepatitis C screen  Completed    HIV screen  Completed    Hepatitis A vaccine  Aged Out    Meningococcal (ACWY) vaccine  Aged Out

## 2022-05-25 NOTE — PROGRESS NOTES
scolSubjective:    Xiomy Childers. is a 33 y.o. male with  has no past medical history on file. Presented to the office today for:  Chief Complaint   Patient presents with    Scoliosis     follow up, requesting Xray for back       HPI    33year old male here today with concern for scoliosis of his lower back. Patient stated that he was told that he had scoliosis of his lower back since he was young. Patient stated he has chronic low midline back pain since he was young. Patient denied any other associated symptoms. Denied any radiating pain, fever, chills, weakness. Patient had x-ray of lumbar spine in [facility] 1 year prior which did not reveal any scoliosis. On exam today, no obvious deformity noted of spine. Patient stated he has tried PT in the past with no benefit. No other concerns today. Review of Systems   Constitutional: Negative for chills and fever. Eyes: Negative for visual disturbance. Respiratory: Negative for chest tightness and shortness of breath. Cardiovascular: Negative for chest pain and leg swelling. Gastrointestinal: Negative for abdominal pain, constipation, diarrhea, nausea and vomiting. Genitourinary: Negative for difficulty urinating. Musculoskeletal:        Low midline back pain   Neurological: Negative for headaches. The patient has a History reviewed. No pertinent family history. Objective:    /61 (Site: Left Upper Arm, Position: Sitting, Cuff Size: Medium Adult)   Pulse 69   Wt 147 lb (66.7 kg)   BMI 19.53 kg/m²    BP Readings from Last 3 Encounters:   05/25/22 106/61   01/29/22 139/79   09/27/21 120/79       Physical Exam  Constitutional:       Appearance: He is well-developed. HENT:      Head: Normocephalic and atraumatic. Eyes:      Pupils: Pupils are equal, round, and reactive to light. Cardiovascular:      Rate and Rhythm: Normal rate and regular rhythm. Heart sounds: Normal heart sounds.    Pulmonary:      Effort: Pulmonary effort is normal. No respiratory distress. Breath sounds: Normal breath sounds. No wheezing or rales. Abdominal:      General: Bowel sounds are normal.      Palpations: Abdomen is soft. Tenderness: There is no abdominal tenderness. Musculoskeletal:         General: No deformity. Normal range of motion. Comments: No obvious deformity noted along spine, forward bend test unremarkable. Skin:     General: Skin is warm and dry. Neurological:      Mental Status: He is alert and oriented to person, place, and time. Lab Results   Component Value Date    WBC 4.3 01/29/2022    HGB 15.5 01/29/2022    HCT 44.0 01/29/2022     01/29/2022    ALT 20 01/29/2022    AST 36 01/29/2022     01/29/2022    K 3.8 01/29/2022     01/29/2022    CREATININE 0.83 01/29/2022    BUN 10 01/29/2022    CO2 22 01/29/2022     Lab Results   Component Value Date    CALCIUM 9.3 01/29/2022     No results found for: LDLCALC, LDLCHOLESTEROL, LDLDIRECT    Assessment and Plan:    1. Chronic midline low back pain without sciatica  - counseled patient that no significant findings were found on previous lumbar x-ray. Patient stated that when he had the x-ray they had told him that he had an abnormal spine. Review of the x-ray report is unremarkable. Discussed with patient, will repeat x-ray lumbar spine and will review these images and report with patient  - XR LUMBAR SPINE (2-3 VIEWS); Future          Requested Prescriptions      No prescriptions requested or ordered in this encounter       There are no discontinued medications. Landon Roper received counseling on the following healthy behaviors: nutrition, exercise and medication adherence    Discussed use,benefit, and side effects of prescribed medications. Barriers to medication compliance addressed. All patient questions answered. Pt voiced understanding. Return in about 4 weeks (around 6/22/2022) for Back pain.         Disclaimer: Some orall of this note was transcribed using voice-recognition software. This may cause typographical errors occasionally. Although all effort is made to fix these errors, please do not hesitate to contact our office if there Patsy Angers concern with the understanding of this note.

## 2022-05-26 ASSESSMENT — ENCOUNTER SYMPTOMS
ABDOMINAL PAIN: 0
NAUSEA: 0
CHEST TIGHTNESS: 0
SHORTNESS OF BREATH: 0
DIARRHEA: 0
VOMITING: 0
CONSTIPATION: 0

## 2022-05-26 NOTE — PROGRESS NOTES
I did not see, evaluate, or participate in the care of this patient. I signed up for this patient in error. Guera Miller DO  Emergency Medicine Resident Physician, PGY-3  11/19/20 12:15 AM negative...

## 2022-06-01 NOTE — PROGRESS NOTES
Attending Physician Statement  I  have discussed the care of Iraj Edwards including pertinent history and exam findings with the resident. I agree with the assessment, plan and orders as documented by the resident. /61 (Site: Left Upper Arm, Position: Sitting, Cuff Size: Medium Adult)   Pulse 69   Wt 147 lb (66.7 kg)   BMI 19.53 kg/m²    BP Readings from Last 3 Encounters:   05/25/22 106/61   01/29/22 139/79   09/27/21 120/79     Wt Readings from Last 3 Encounters:   05/25/22 147 lb (66.7 kg)   01/29/22 147 lb (66.7 kg)   09/27/21 149 lb 6.4 oz (67.8 kg)          Diagnosis Orders   1.  Chronic midline low back pain without sciatica  XR LUMBAR SPINE (2-3 VIEWS)       Edyta Monday, DO 6/1/2022 2:08 PM

## 2022-06-24 ENCOUNTER — APPOINTMENT (OUTPATIENT)
Dept: GENERAL RADIOLOGY | Age: 27
End: 2022-06-24
Payer: MEDICAID

## 2022-06-24 ENCOUNTER — HOSPITAL ENCOUNTER (EMERGENCY)
Age: 27
Discharge: HOME | End: 2022-06-24
Attending: EMERGENCY MEDICINE
Payer: MEDICAID

## 2022-06-24 VITALS
RESPIRATION RATE: 14 BRPM | SYSTOLIC BLOOD PRESSURE: 126 MMHG | OXYGEN SATURATION: 99 % | DIASTOLIC BLOOD PRESSURE: 78 MMHG | TEMPERATURE: 98.1 F | HEART RATE: 83 BPM

## 2022-06-24 DIAGNOSIS — M25.521 RIGHT ELBOW PAIN: Primary | ICD-10-CM

## 2022-06-24 PROCEDURE — 73080 X-RAY EXAM OF ELBOW: CPT

## 2022-06-24 PROCEDURE — 99283 EMERGENCY DEPT VISIT LOW MDM: CPT

## 2022-06-24 ASSESSMENT — ENCOUNTER SYMPTOMS
ABDOMINAL PAIN: 0
SHORTNESS OF BREATH: 0
VOMITING: 0
SORE THROAT: 0
NAUSEA: 0
CONSTIPATION: 0
DIARRHEA: 0

## 2022-06-24 NOTE — ED PROVIDER NOTES
[facility]     Emergency Department     Faculty Attestation        I performed a history and physical examination of the patient and discussed management with the resident. I reviewed the residents note and agree with the documented findings and plan of care. Any areas of disagreement are noted on the chart. I was personally present for the key portions of any procedures. I have documented in the chart those procedures where I was not present during the key portions. I have reviewed the emergency nurses triage note. I agree with the chief complaint, past medical history, past surgical history, allergies, medications, social and family history as documented unless otherwise noted below. For mid-level providers such as nurse practitioners as well as physicians assistants:    I have personally seen and evaluated the patient. I find the patient's history and physical exam are consistent with NP/PA documentation. I agree with the care provided, treatment rendered, disposition, & follow-up plan. Additional findings are as noted.     Vital Signs: /78   Pulse 83   Temp 98.1 °F (36.7 °C) (Oral)   Resp 14   SpO2 99%   PCP:  Belle Clark MD    Pertinent Comments:           Critical Care  None          Estela Tyler MD    Attending Emergency Medicine Physician              Jono Pope MD  06/24/22 9911
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
## 2022-07-07 ENCOUNTER — OFFICE VISIT (OUTPATIENT)
Dept: FAMILY MEDICINE CLINIC | Age: 27
End: 2022-07-07
Payer: MEDICAID

## 2022-07-07 VITALS
SYSTOLIC BLOOD PRESSURE: 106 MMHG | DIASTOLIC BLOOD PRESSURE: 72 MMHG | HEART RATE: 51 BPM | WEIGHT: 148 LBS | BODY MASS INDEX: 19.66 KG/M2

## 2022-07-07 DIAGNOSIS — G89.29 CHRONIC LOW BACK PAIN, UNSPECIFIED BACK PAIN LATERALITY, UNSPECIFIED WHETHER SCIATICA PRESENT: Primary | ICD-10-CM

## 2022-07-07 DIAGNOSIS — M54.50 CHRONIC LOW BACK PAIN, UNSPECIFIED BACK PAIN LATERALITY, UNSPECIFIED WHETHER SCIATICA PRESENT: Primary | ICD-10-CM

## 2022-07-07 PROCEDURE — G8427 DOCREV CUR MEDS BY ELIG CLIN: HCPCS

## 2022-07-07 PROCEDURE — 4004F PT TOBACCO SCREEN RCVD TLK: CPT

## 2022-07-07 PROCEDURE — G8420 CALC BMI NORM PARAMETERS: HCPCS

## 2022-07-07 PROCEDURE — 99213 OFFICE O/P EST LOW 20 MIN: CPT

## 2022-07-07 ASSESSMENT — PATIENT HEALTH QUESTIONNAIRE - PHQ9
7. TROUBLE CONCENTRATING ON THINGS, SUCH AS READING THE NEWSPAPER OR WATCHING TELEVISION: 0
2. FEELING DOWN, DEPRESSED OR HOPELESS: 0
4. FEELING TIRED OR HAVING LITTLE ENERGY: 0
8. MOVING OR SPEAKING SO SLOWLY THAT OTHER PEOPLE COULD HAVE NOTICED. OR THE OPPOSITE, BEING SO FIGETY OR RESTLESS THAT YOU HAVE BEEN MOVING AROUND A LOT MORE THAN USUAL: 0
5. POOR APPETITE OR OVEREATING: 0
SUM OF ALL RESPONSES TO PHQ QUESTIONS 1-9: 0
10. IF YOU CHECKED OFF ANY PROBLEMS, HOW DIFFICULT HAVE THESE PROBLEMS MADE IT FOR YOU TO DO YOUR WORK, TAKE CARE OF THINGS AT HOME, OR GET ALONG WITH OTHER PEOPLE: 0
1. LITTLE INTEREST OR PLEASURE IN DOING THINGS: 0
SUM OF ALL RESPONSES TO PHQ QUESTIONS 1-9: 0
SUM OF ALL RESPONSES TO PHQ9 QUESTIONS 1 & 2: 0
3. TROUBLE FALLING OR STAYING ASLEEP: 0
9. THOUGHTS THAT YOU WOULD BE BETTER OFF DEAD, OR OF HURTING YOURSELF: 0
6. FEELING BAD ABOUT YOURSELF - OR THAT YOU ARE A FAILURE OR HAVE LET YOURSELF OR YOUR FAMILY DOWN: 0

## 2022-07-07 ASSESSMENT — ENCOUNTER SYMPTOMS: BACK PAIN: 1

## 2022-07-07 NOTE — PROGRESS NOTES
Attending Physician Statement  I have discussed the care of Shailesh Hopkins 33 y.o. male, including pertinent history and exam findings, with the resident Dr. Byron Miller MD.    History and Exam:   Chief Complaint   Patient presents with    Back Pain     follow up    Other     please reorder x-ray of spine       History reviewed. No pertinent past medical history. No Known Allergies   I have seen and examined the patient and the key elements of the encounter have been performed by me. BP Readings from Last 3 Encounters:   07/07/22 106/72   06/24/22 126/78   05/25/22 106/61     /72 (Site: Left Upper Arm, Position: Sitting, Cuff Size: Medium Adult)   Pulse 51   Wt 148 lb (67.1 kg)   BMI 19.66 kg/m²   Lab Results   Component Value Date    WBC 4.3 01/29/2022    HGB 15.5 01/29/2022    HCT 44.0 01/29/2022     01/29/2022    ALT 20 01/29/2022    AST 36 01/29/2022     01/29/2022    K 3.8 01/29/2022     01/29/2022    CREATININE 0.83 01/29/2022    BUN 10 01/29/2022    CO2 22 01/29/2022     Lab Results   Component Value Date    LABALBU 4.8 01/29/2022     No results found for: IRON, TIBC, FERRITIN  No results found for: LDLCALC, LDLCHOLESTEROL, LDLDIRECT  I agree with the assessment, plan and the diagnosis of    Diagnosis Orders   1. Chronic low back pain, unspecified back pain laterality, unspecified whether sciatica present      . I agree with orders as documented by the resident. More than 25 minutes spent  in face to face encounter with the patient and more than half in counseling. Patient's questions were answered. Patient Voiced understanding to the counseling. Return in about 3 months (around 10/7/2022) for Back pain.    (GC Modifier)-Dr. Sriram Eden MD

## 2022-07-07 NOTE — PROGRESS NOTES
Subjective:    Seema Reyes is a 33 y.o. male with  has no past medical history on file. Presented to the office today for:  Chief Complaint   Patient presents with    Back Pain     follow up    Other     please reorder x-ray of spine       HPI    Back pain  Patient has had back pain for the last 5 years  He may have scoliosis but did not let me examine him. During patient's last visit he was ordered an x-ray which he did not get      We are trying to get x-ray imaging for him time to look at the extent of his scoliosis  Unable to find any previous radiology imaging but there is a report of from about 6 months ago when he went to the emergency department we will reprint that encounter for him  At that time Dr. Loetta Harada updated pt on his xray coming back unremarkable w/ no evidence of acute fractures, injuries, or malalignment. Dr. Loetta Harada discussed discharging pt home w/ Motrin, muscle relaxer, and at-home exercises for his back. Instructions were also given to f/u w/ his PCP for further work-up and care. Review of Systems   Unable to perform ROS: Other   Musculoskeletal: Positive for back pain. Psychiatric/Behavioral: Positive for agitation. The patient has a History reviewed. No pertinent family history.     Objective:    /72 (Site: Left Upper Arm, Position: Sitting, Cuff Size: Medium Adult)   Pulse 51   Wt 148 lb (67.1 kg)   BMI 19.66 kg/m²    BP Readings from Last 3 Encounters:   07/07/22 106/72   06/24/22 126/78   05/25/22 106/61       Physical Exam  Constitutional:       Comments: Patient did not let me do physical examination         Lab Results   Component Value Date    WBC 4.3 01/29/2022    HGB 15.5 01/29/2022    HCT 44.0 01/29/2022     01/29/2022    ALT 20 01/29/2022    AST 36 01/29/2022     01/29/2022    K 3.8 01/29/2022     01/29/2022    CREATININE 0.83 01/29/2022    BUN 10 01/29/2022    CO2 22 01/29/2022     Lab Results   Component Value Date    CALCIUM 9.3 01/29/2022     No results found for: LDLCALC, LDLCHOLESTEROL, LDLDIRECT    Assessment and Plan:    1. Chronic low back pain, unspecified back pain laterality, unspecified whether sciatica present  Need to get x-rays done  Patient reports that he may have scoliosis  Did not let me examine him so I could not assess him  Also we will get updated when his x-ray comes back  Patient does not want to go to orthopedics at this time          Requested Prescriptions      No prescriptions requested or ordered in this encounter       There are no discontinued medications. [facility] received counseling on the following healthy behaviors: nutrition, exercise and medication adherence    Discussed use,benefit, and side effects of prescribed medications. Barriers to medication compliance addressed. All patient questions answered. Pt voiced understanding. Return in about 3 months (around 10/7/2022) for Back pain. Disclaimer: Some orall of this note was transcribed using voice-recognition software. This may cause typographical errors occasionally. Although all effort is made to fix these errors, please do not hesitate to contact our office if there Luana Jordan concern with the understanding of this note.

## 2022-07-07 NOTE — PROGRESS NOTES
Visit Information    Have you changed or started any medications since your last visit including any over-the-counter medicines, vitamins, or herbal medicines? no   Have you stopped taking any of your medications? Is so, why? -  no  Are you having any side effects from any of your medications? - no    Have you seen any other physician or provider since your last visit?  no   Have you had any other diagnostic tests since your last visit? yes - at [facility]   Have you been seen in the emergency room and/or had an admission in a hospital since we last saw you?  yes - at [facility]   Have you had your routine dental cleaning in the past 6 months?  no     Do you have an active MyChart account? If no, what is the barrier?   Yes    Patient Care Team:  Silvia Olmos MD as PCP - General (Family Medicine)  Silvia Olmos MD as PCP - [facility] P[facility] Roldan Provider    Medical History Review  Past Medical, Family, and Social History reviewed and does not contribute to the patient presenting condition    Health Maintenance   Topic Date Due    Varicella vaccine (1 of 2 - 2-dose childhood series) Never done    COVID-19 Vaccine (1) Never done    Pneumococcal 0-64 years Vaccine (1 - PCV) Never done    Flu vaccine (1) 09/01/2022    Depression Screen  05/25/2023    DTaP/Tdap/Td vaccine (8 - Td or Tdap) 09/17/2028    Hepatitis B vaccine  Completed    Hib vaccine  Completed    HPV vaccine  Completed    Hepatitis C screen  Completed    HIV screen  Completed    Hepatitis A vaccine  Aged Out    Meningococcal (ACWY) vaccine  Aged Out

## 2022-08-01 ENCOUNTER — HOSPITAL ENCOUNTER (OUTPATIENT)
Age: 27
Discharge: HOME | End: 2022-08-03
Payer: MEDICAID

## 2022-08-01 ENCOUNTER — HOSPITAL ENCOUNTER (OUTPATIENT)
Dept: GENERAL RADIOLOGY | Age: 27
Discharge: HOME | End: 2022-08-03
Payer: MEDICAID

## 2022-08-01 DIAGNOSIS — G89.29 CHRONIC MIDLINE LOW BACK PAIN WITHOUT SCIATICA: ICD-10-CM

## 2022-08-01 DIAGNOSIS — M54.50 CHRONIC MIDLINE LOW BACK PAIN WITHOUT SCIATICA: ICD-10-CM

## 2022-08-01 PROCEDURE — 72100 X-RAY EXAM L-S SPINE 2/3 VWS: CPT

## 2022-08-18 ENCOUNTER — OFFICE VISIT (OUTPATIENT)
Dept: FAMILY MEDICINE CLINIC | Age: 27
End: 2022-08-18
Payer: MEDICAID

## 2022-08-18 VITALS
TEMPERATURE: 97.8 F | BODY MASS INDEX: 19.01 KG/M2 | SYSTOLIC BLOOD PRESSURE: 112 MMHG | WEIGHT: 143.4 LBS | DIASTOLIC BLOOD PRESSURE: 60 MMHG | HEART RATE: 84 BPM | HEIGHT: 73 IN

## 2022-08-18 DIAGNOSIS — M62.830 SPASM OF MUSCLE OF LOWER BACK: Primary | ICD-10-CM

## 2022-08-18 PROCEDURE — 99213 OFFICE O/P EST LOW 20 MIN: CPT

## 2022-08-18 PROCEDURE — 4004F PT TOBACCO SCREEN RCVD TLK: CPT

## 2022-08-18 PROCEDURE — G8420 CALC BMI NORM PARAMETERS: HCPCS

## 2022-08-18 PROCEDURE — G8427 DOCREV CUR MEDS BY ELIG CLIN: HCPCS

## 2022-08-18 RX ORDER — CYCLOBENZAPRINE HCL 10 MG
10 TABLET ORAL 3 TIMES DAILY PRN
Qty: 21 TABLET | Refills: 0 | Status: SHIPPED | OUTPATIENT
Start: 2022-08-18 | End: 2022-08-28

## 2022-08-18 ASSESSMENT — ENCOUNTER SYMPTOMS
COUGH: 0
ABDOMINAL PAIN: 0
SORE THROAT: 0
BACK PAIN: 1

## 2022-08-18 NOTE — PATIENT INSTRUCTIONS
Thank you for letting us take care of you today. We hope all your questions were addressed. If a question was overlooked or something else comes to mind after you return home, please contact a member of your Care Team listed below. Your Care Team at [place] [address] is Team #1  Gisell Medina MD (Faculty)  Alea Moran MD(Resident)  Mervat Bustamante MD (Resident)  Barbara Lewis DO (Resident)  David Shelley., HAILEE Ivan., CHANDRIKA West., JEFFERY Talbert., Mercedes Aguayo., [facility] office)  Yuki Talbot, [address] (Clinical Practice Manager)  Stephan Berry [facility] (Clinical Pharmacist)     Office phone number: [phone]    If you need to get in right away due to illness, please be advised we have \"Same Day\" appointments available Monday-Friday. Please call us at [phone] option #3 to schedule your \"Same Day\" appointment.

## 2022-08-18 NOTE — PROGRESS NOTES
[facility]    Family Medicine Residency Program - Outpatient Note      Subjective:    Jinny Bates. is a 33 y.o. male with  has no past medical history on file. Presented to the office today for:  Chief Complaint   Patient presents with    Back Pain     Follow up     Referral - General     Patient would like to have referral for ortho , pain management ,  for help with rides and to find out what services that his insurance covers       HPI    Patient complains of low back pain - reports scoliosis since 2006. Recent xray lumbar spine - 5/25/22   FINDINGS:   Lumbar vertebral bodies are normal in height and alignment. No evidence of   fracture. Visualized sacrum is unremarkable. Disc spaces appear unremarkable     Was doing therapy at [facility], stopped 2 years ago. (Reports being dismissed from therapy)    Patient is very insistent on getting medical medical marijuana for back pain. we declined and let him know our system doesn't allow medical marijuana. We will give him flexeril for back spasms. He is resistant to physical examination, Only pulled up his shirt for a brief moment to show his lower back. Patient is on probation- requesting a letter stating he has scoliosis. We will write a letter for low back spasms not scoliosis. Review of Systems   Constitutional:  Negative for chills and fever. HENT:  Negative for sore throat. Respiratory:  Negative for cough. Cardiovascular:  Negative for chest pain. Gastrointestinal:  Negative for abdominal pain. Musculoskeletal:  Positive for back pain. The patient has a No family history on file.     Objective:    /60 (Site: Left Upper Arm, Position: Sitting, Cuff Size: Large Adult) Comment: machine  Pulse 84   Temp 97.8 °F (36.6 °C) (Temporal)   Ht 6' 0.76\" (1.848 m)   Wt 143 lb 6.4 oz (65 kg)   BMI 19.05 kg/m²    BP Readings from Last 3 Encounters:   08/18/22 112/60   07/07/22 106/72   06/24/22 126/78       Physical Exam  Vitals and nursing note reviewed. Physical exam was limited to Vitals only. Lab Results   Component Value Date    WBC 4.3 01/29/2022    HGB 15.5 01/29/2022    HCT 44.0 01/29/2022     01/29/2022    ALT 20 01/29/2022    AST 36 01/29/2022     01/29/2022    K 3.8 01/29/2022     01/29/2022    CREATININE 0.83 01/29/2022    BUN 10 01/29/2022    CO2 22 01/29/2022     Lab Results   Component Value Date    CALCIUM 9.3 01/29/2022     No results found for: LDLCALC, LDLCHOLESTEROL, LDLDIRECT    Assessment and Plan:    1. Spasm of muscle of lower back    - cyclobenzaprine (FLEXERIL) 10 MG tablet; Take 1 tablet by mouth 3 times daily as needed for Muscle spasms  Dispense: 21 tablet; Refill: 0    2. [address] [facility] Referral for Social Determinants of Health (Primary Care Practices) - for transportation needs    3. Patient was given a work letter on his request stating he has back spasms and reported scoliosis which could limit his functionality. Follow up PRN for back spasms. Requested Prescriptions     Signed Prescriptions Disp Refills    cyclobenzaprine (FLEXERIL) 10 MG tablet 21 tablet 0     Sig: Take 1 tablet by mouth 3 times daily as needed for Muscle spasms       There are no discontinued medications. Mc Armendariz received counseling on the following healthy behaviors: nutrition, exercise and medication adherence    Discussed use,benefit, and side effects of prescribed medications. Barriers to medication compliance addressed. All patient questions answered. Pt voiced understanding. Return Back spasms follow up. Disclaimer: Some orall of this note was transcribed using voice-recognition software. This may cause typographical errors occasionally. Although all effort is made to fix these errors, please do not hesitate to contact our office if there [address] [place] concern with the understanding of this note.

## 2022-08-18 NOTE — LETTER
[facility]  [address]  [place] [address]  Phone: [phone]  Fax: [phone]    Keke Talbert MD        August 18, 2022     Patient: Emely Garsia . YOB: 1995   Date of Visit: 8/18/2022       To Whom It May Concern: It is my medical opinion that Charis Polk has a back condition - spasms and reported scolisis. .    Be advised that this could limit his functional lifting, bending and standing. If you have any questions or concerns, please don't hesitate to call.     Sincerely,        Keke Talbert MD

## 2022-08-18 NOTE — PROGRESS NOTES
PGY 1 resident and I attempted conversation with patient  Patient was highly defensive, argumentative and demanding  Patient was insisting on getting a marijuana card-he was advised we would not do marijuana cards as it is not a policy of [facility]. He was advised that he can pursue this with other physicians or obtain this card over the line in [place] where it is much more available    Patient was angry about his back and stated no one is listening to him about his back problem. Reports a history of scoliosis. He states he needs something for his back pain. Reports that marijuana has helped. I have advised him that I would offer a muscle relaxer for spasms. He was less than inclined to accept this. No other medications were offered at this time. Physical exam was limited to just a visual exam of his low back which did not show much in the terms of a scoliosis and therefore any back pain is purely symptomatic at this time. Attending Physician Statement  I have discussed the case, including pertinent history and exam findings with the resident. I have seen and examined the patient and the key elements of the encounter have been performed by me. I agree with the assessment, plan and orders as documented by the resident.         /60 (Site: Left Upper Arm, Position: Sitting, Cuff Size: Large Adult) Comment: machine  Pulse 84   Temp 97.8 °F (36.6 °C) (Temporal)   Ht 6' 0.76\" (1.848 m)   Wt 143 lb 6.4 oz (65 kg)   BMI 19.05 kg/m²    BP Readings from Last 3 Encounters:   08/18/22 112/60   07/07/22 106/72   06/24/22 126/78     Wt Readings from Last 3 Encounters:   08/18/22 143 lb 6.4 oz (65 kg)   07/07/22 148 lb (67.1 kg)   05/25/22 147 lb (66.7 kg)       Barbara Olmos DO 8/18/2022 1:16 PM

## 2022-08-22 ENCOUNTER — TELEPHONE (OUTPATIENT)
Dept: FAMILY MEDICINE CLINIC | Age: 27
End: 2022-08-22

## 2022-09-02 ENCOUNTER — TELEPHONE (OUTPATIENT)
Dept: FAMILY MEDICINE CLINIC | Age: 27
End: 2022-09-02

## 2022-09-02 NOTE — TELEPHONE ENCOUNTER
Murali Walters Sr. was contacted by Eugenia Tiwari MA, a Teresa Monteiro, regarding a Social Determinants of Health referral.     A message was left with the writer's contact information. Second contact attempt.

## 2022-09-08 NOTE — TELEPHONE ENCOUNTER
Alejandra Valentin Sr. was contacted by Phil Fried MA, kimberlee Monteiro, regarding a Social Determinants of Health referral.     A message was left with the writer's contact information. Third contact attempt.  Will send letter and close referral.

## 2022-12-03 ENCOUNTER — HOSPITAL ENCOUNTER (EMERGENCY)
Age: 27
Discharge: HOME | End: 2022-12-03
Attending: EMERGENCY MEDICINE
Payer: MEDICAID

## 2022-12-03 VITALS
DIASTOLIC BLOOD PRESSURE: 77 MMHG | SYSTOLIC BLOOD PRESSURE: 111 MMHG | TEMPERATURE: 98.2 F | OXYGEN SATURATION: 97 % | RESPIRATION RATE: 16 BRPM | HEART RATE: 70 BPM

## 2022-12-03 DIAGNOSIS — R09.81 NASAL CONGESTION: Primary | ICD-10-CM

## 2022-12-03 PROCEDURE — 99283 EMERGENCY DEPT VISIT LOW MDM: CPT

## 2022-12-03 PROCEDURE — 6370000000 HC RX 637 (ALT 250 FOR IP): Performed by: STUDENT IN AN ORGANIZED HEALTH CARE EDUCATION/TRAINING PROGRAM

## 2022-12-03 RX ORDER — GUAIFENESIN DEXTROMETHORPHAN HYDROBROMIDE ORAL SOLUTION 10; 100 MG/5ML; MG/5ML
10 SOLUTION ORAL ONCE
Status: COMPLETED | OUTPATIENT
Start: 2022-12-03 | End: 2022-12-03

## 2022-12-03 RX ORDER — IBUPROFEN 600 MG/1
600 TABLET ORAL EVERY 6 HOURS PRN
Qty: 28 TABLET | Refills: 0 | Status: SHIPPED | OUTPATIENT
Start: 2022-12-03 | End: 2022-12-10

## 2022-12-03 RX ORDER — IBUPROFEN 800 MG/1
800 TABLET ORAL ONCE
Status: COMPLETED | OUTPATIENT
Start: 2022-12-03 | End: 2022-12-03

## 2022-12-03 RX ADMIN — IBUPROFEN 800 MG: 800 TABLET, FILM COATED ORAL at 07:11

## 2022-12-03 RX ADMIN — DEXTROMETHORPHAN HYDROBROMIDE, GUAIFENESIN 5 ML: 10; 100 LIQUID ORAL at 07:12

## 2022-12-03 ASSESSMENT — ENCOUNTER SYMPTOMS
ABDOMINAL PAIN: 0
COUGH: 1
VOMITING: 0
SORE THROAT: 0
BACK PAIN: 0
SHORTNESS OF BREATH: 0

## 2022-12-03 ASSESSMENT — PAIN - FUNCTIONAL ASSESSMENT: PAIN_FUNCTIONAL_ASSESSMENT: NONE - DENIES PAIN

## 2022-12-03 ASSESSMENT — PAIN SCALES - GENERAL: PAINLEVEL_OUTOF10: 0

## 2022-12-03 NOTE — ED PROVIDER NOTES
[facility]  Emergency Department  Faculty Attestation     I performed a history and physical examination of the patient and discussed management with the resident. I reviewed the residents note and agree with the documented findings and plan of care. Any areas of disagreement are noted on the chart. I was personally present for the key portions of any procedures. I have documented in the chart those procedures where I was not present during the key portions. I have reviewed the emergency nurses triage note. I agree with the chief complaint, past medical history, past surgical history, allergies, medications, social and family history as documented unless otherwise noted below. For Physician Assistant/ Nurse Practitioner cases/documentation I have personally evaluated this patient and have completed at least one if not all key elements of the E/M (history, physical exam, and MDM). Additional findings are as noted. Primary Care Physician:  No primary care provider on file. Screenings:  [unfilled]    CHIEF COMPLAINT       Chief Complaint   Patient presents with    Cough       RECENT VITALS:   Temp: 98.2 °F (36.8 °C),  Heart Rate: 70, Resp: 16, BP: 111/77    LABS:  Labs Reviewed - No data to display    Radiology  No orders to display           Attending Physician Additional  Notes    Patient been over the past several hours with persistent cough and green sputum. No shortness of breath. No chest pain. No fever chills or cough. No myalgias or influenza-like illness. No vomiting or diarrhea. He does smoke. No history of asthma. No recent wheezing. On exam is nontoxic afebrile vital signs normal.  Lungs are clear. No JVD. No gallop. No edema cords Homans or calf tenderness. Skin is warm and dry. Impression is viral syndrome, early bronchitis. Plan is antitussive medication, return precautions. Janina Haley.  Aaron Boothe MD, [facility]  Attending Emergency Physician                Umberto Burch MD  12/03/22 6724

## 2022-12-03 NOTE — ED PROVIDER NOTES
[facility] ED  Emergency Department Encounter  Emergency Medicine Resident     Pt Binta Ludwig Sr. MRN: [id number]  Birthdate 1995  Date of evaluation: 12/3/22  PCP:  No primary care provider on file. CHIEF COMPLAINT       Chief Complaint   Patient presents with    Cough       HISTORY OF PRESENT ILLNESS  (Location/Symptom, Timing/Onset, Context/Setting, Quality, Duration, Modifying Factors, Severity.)      Jonas Rosales. is a 33 y.o. male who presents with nasal congestion, cough since yesterday evening. Patient states that he has been taking his girlfriends cough syrup without improvement in symptoms. Denies chest pain or shortness of breath. Daily smoker. No history of asthma or COPD. No other symptoms. Denies nausea, vomiting, diarrhea, abdominal pain. No significant medical history. Has not taken Tylenol Motrin otherwise. PAST MEDICAL / SURGICAL / SOCIAL / FAMILY HISTORY      has no past medical history on file. Reviewed with patient     has no past surgical history on file.   Reviewed with patient    Social History     Socioeconomic History    Marital status: Single     Spouse name: Not on file    Number of children: Not on file    Years of education: Not on file    Highest education level: Not on file   Occupational History    Not on file   Tobacco Use    Smoking status: Every Day     Packs/day: 1.00     Types: Cigarettes    Smokeless tobacco: Never   Substance and Sexual Activity    Alcohol use: Yes     Comment: Daily     Drug use: Yes     Types: Marijuana Judy Francis    Sexual activity: Yes     Partners: Female   Other Topics Concern    Not on file   Social History Narrative    Not on file     Social Determinants of Health     Financial Resource Strain: Not on file   Food Insecurity: Not on file   Transportation Needs: Not on file   Physical Activity: Not on file   Stress: Not on file   Social Connections: Not on file   Intimate Partner Violence: Not on file   Housing Stability: Not on file       History reviewed. No pertinent family history. Allergies:  Patient has no known allergies. Home Medications:  Prior to Admission medications    Medication Sig Start Date End Date Taking? Authorizing Provider   benzocaine-menthol (CEPACOL) 15-4 MG LOZG lozenge Take 1 lozenge by mouth every 2 hours as needed for Sore Throat 12/3/22  Yes YOMI Busby MD   ibuprofen (IBU) 600 MG tablet Take 1 tablet by mouth every 6 hours as needed for Pain 12/3/22 12/10/22 Yes Gris Eli MD       REVIEW OF SYSTEMS    (2-9 systems for level 4, 10 or more for level 5)      Review of Systems   Constitutional:  Negative for chills and fever. HENT:  Positive for congestion. Negative for sore throat. Eyes:  Negative for visual disturbance. Respiratory:  Positive for cough. Negative for shortness of breath. Cardiovascular:  Negative for chest pain and palpitations. Gastrointestinal:  Negative for abdominal pain and vomiting. Endocrine: Negative for polyuria. Genitourinary:  Negative for dysuria and hematuria. Musculoskeletal:  Negative for back pain. Skin:  Negative for rash. Neurological:  Negative for light-headedness and headaches. Psychiatric/Behavioral:  Negative for confusion. PHYSICAL EXAM   (up to 7 for level 4, 8 or more for level 5)      INITIAL VITALS:   /77   Pulse 70   Temp 98.2 °F (36.8 °C) (Oral)   Resp 16   SpO2 97%     Physical Exam  Constitutional:       Appearance: Normal appearance. HENT:      Head: Normocephalic. Nose: Congestion present. Mouth/Throat:      Mouth: Mucous membranes are moist.      Pharynx: Oropharynx is clear. Eyes:      Extraocular Movements: Extraocular movements intact. Pupils: Pupils are equal, round, and reactive to light. Cardiovascular:      Rate and Rhythm: Normal rate and regular rhythm. Pulses: Normal pulses. Heart sounds: Normal heart sounds.    Pulmonary:      Effort: Pulmonary effort is normal.      Breath sounds: Normal breath sounds. Abdominal:      Palpations: Abdomen is soft. Tenderness: There is no abdominal tenderness. There is no right CVA tenderness or left CVA tenderness. Musculoskeletal:      Cervical back: No tenderness. Skin:     General: Skin is warm. Capillary Refill: Capillary refill takes less than 2 seconds. Neurological:      General: No focal deficit present. Mental Status: He is alert and oriented to person, place, and time. Mental status is at baseline. DIFFERENTIAL  DIAGNOSIS     PLAN (LABS / IMAGING / EKG):  No orders of the defined types were placed in this encounter. MEDICATIONS ORDERED:  Orders Placed This Encounter   Medications    ibuprofen (ADVIL;MOTRIN) tablet 800 mg    dextromethorphan-guaiFENesin (ROBITUSSIN-DM)  MG/5ML liquid 10 mL    benzocaine-menthol (CEPACOL) 15-4 MG LOZG lozenge     Sig: Take 1 lozenge by mouth every 2 hours as needed for Sore Throat     Dispense:  48 lozenge     Refill:  0    ibuprofen (IBU) 600 MG tablet     Sig: Take 1 tablet by mouth every 6 hours as needed for Pain     Dispense:  28 tablet     Refill:  0     DDX: URI, COVID, influenza, RSV    DIAGNOSTIC RESULTS / EMERGENCY DEPARTMENT COURSE / MDM   LAB RESULTS:  No results found for this visit on 12/03/22. IMPRESSION: 27-year-old gentleman presents to the emergency department with 1 day history of URI symptoms. No other symptoms. Vital signs unremarkable, afebrile nontachycardic. Physical exam showing clear bilateral breath sounds. Patient is slightly congested. Well-appearing. Mucous membranes moist.  Motrin and Robitussin given in the emergency department. Discussed with patient with regards to follow-up with primary care physician and for return precautions. Patient verbalized agreement or standing. Stable for discharge.     RADIOLOGY:  No orders to display     EMERGENCY DEPARTMENT COURSE:       No notes of EC Admission Criteria type on file.    PROCEDURES:  None    CONSULTS:  None    CRITICAL CARE:  None    FINAL IMPRESSION      1.  Nasal congestion          DISPOSITION / PLAN     DISPOSITION Decision To Discharge 12/03/2022 08:20:17 AM      PATIENT REFERRED TO:  [address]  [address] [place] [id number] [phone]  Schedule an appointment as soon as possible for a visit   For follow up    [facility]  [place] [address]  [phone]  Go to   As needed    DISCHARGE MEDICATIONS:  New Prescriptions    BENZOCAINE-MENTHOL (CEPACOL) 15-4 MG LOZG LOZENGE    Take 1 lozenge by mouth every 2 hours as needed for Sore Throat    IBUPROFEN (IBU) 600 MG TABLET    Take 1 tablet by mouth every 6 hours as needed for Pain       Cara Wesley MD  Emergency Medicine Resident    (Please note that portions of thisnote were completed with a voice recognition program.  Efforts were made to edit the dictations but occasionally words are mis-transcribed.)        Cara Wesley MD  Resident  12/03/22 4550

## 2022-12-03 NOTE — ED NOTES
SW spoke with patient to confirm information in regards to setting up transportation home after discharge. Patient was informed with his insurance it could take anywhere from 30 minutes up to 3 hours. Patient reported he is homeless and wanted to go to [employer]. As patient was confirming information patient stated he would walk as he does not want to wait. SW spoke with patient about contacting [address] in regards to housing. Patient reported [employer] has not helped him. Patient was given information for [employer] to seek assistance with housing.       Maynard Gowers, MSW  12/03/22 8356

## 2023-01-08 ENCOUNTER — APPOINTMENT (OUTPATIENT)
Dept: CT IMAGING | Age: 28
DRG: 057 | End: 2023-01-08
Payer: MEDICAID

## 2023-01-08 ENCOUNTER — HOSPITAL ENCOUNTER (INPATIENT)
Age: 28
LOS: 1 days | Discharge: HOME | DRG: 057 | End: 2023-01-09
Attending: EMERGENCY MEDICINE | Admitting: SURGERY
Payer: MEDICAID

## 2023-01-08 ENCOUNTER — APPOINTMENT (OUTPATIENT)
Dept: GENERAL RADIOLOGY | Age: 28
DRG: 057 | End: 2023-01-08
Payer: MEDICAID

## 2023-01-08 DIAGNOSIS — R41.0 DELIRIUM, ACUTE: ICD-10-CM

## 2023-01-08 DIAGNOSIS — T14.90XA TRAUMA: Primary | ICD-10-CM

## 2023-01-08 DIAGNOSIS — S06.0X1A BRAIN CONCUSSION, WITH LOSS OF CONSCIOUSNESS OF 30 MINUTES OR LESS, INITIAL ENCOUNTER: ICD-10-CM

## 2023-01-08 LAB
ABO/RH: NORMAL
ALBUMIN SERPL-MCNC: 3.8 G/DL (ref 3.5–5.2)
ALBUMIN/GLOBULIN RATIO: 1.7 (ref 1–2.5)
ALLEN TEST: POSITIVE
ALP BLD-CCNC: 52 U/L (ref 40–129)
ALT SERPL-CCNC: 21 U/L (ref 5–41)
AMMONIA: 36 UMOL/L (ref 16–60)
AMPHETAMINE SCREEN URINE: NEGATIVE
ANION GAP SERPL CALCULATED.3IONS-SCNC: 12 MMOL/L (ref 9–17)
ANTIBODY SCREEN: NEGATIVE
ARM BAND NUMBER: NORMAL
AST SERPL-CCNC: 34 U/L
BARBITURATE SCREEN URINE: NEGATIVE
BENZODIAZEPINE SCREEN, URINE: NEGATIVE
BILIRUB SERPL-MCNC: 0.3 MG/DL (ref 0.3–1.2)
BILIRUBIN DIRECT: 0.1 MG/DL
BILIRUBIN URINE: NEGATIVE
BILIRUBIN, INDIRECT: 0.2 MG/DL (ref 0–1)
BLOOD BANK SPECIMEN: ABNORMAL
BUN BLDV-MCNC: 10 MG/DL (ref 6–20)
CANNABINOID SCREEN URINE: POSITIVE
CARBOXYHEMOGLOBIN: 2.7 % (ref 0–5)
CASTS UA: NORMAL /LPF (ref 0–8)
CHLORIDE BLD-SCNC: 103 MMOL/L (ref 98–107)
CO2: 24 MMOL/L (ref 20–31)
COCAINE METABOLITE, URINE: NEGATIVE
COLOR: YELLOW
CREAT SERPL-MCNC: 0.97 MG/DL (ref 0.7–1.2)
EPITHELIAL CELLS UA: NORMAL /HPF (ref 0–5)
ETHANOL PERCENT: 0.13 %
ETHANOL: 129 MG/DL
EXPIRATION DATE: NORMAL
FENTANYL URINE: NEGATIVE
FIO2: 100
FIO2: ABNORMAL
GFR SERPL CREATININE-BSD FRML MDRD: >60 ML/MIN/1.73M2
GLUCOSE BLD-MCNC: 82 MG/DL (ref 74–100)
GLUCOSE BLD-MCNC: 88 MG/DL (ref 70–99)
GLUCOSE URINE: NEGATIVE
HCG QUALITATIVE: ABNORMAL
HCO3 VENOUS: 25.8 MMOL/L (ref 24–30)
HCT VFR BLD CALC: 47.2 % (ref 40.7–50.3)
HEMOGLOBIN: 16.1 G/DL (ref 13–17)
INR BLD: 1
KETONES, URINE: NEGATIVE
LEUKOCYTE ESTERASE, URINE: NEGATIVE
MCH RBC QN AUTO: 31.9 PG (ref 25.2–33.5)
MCHC RBC AUTO-ENTMCNC: 34.1 G/DL (ref 28.4–34.8)
MCV RBC AUTO: 93.7 FL (ref 82.6–102.9)
METHADONE SCREEN, URINE: NEGATIVE
MODE: ABNORMAL
MYOGLOBIN: 157 NG/ML (ref 28–72)
NEGATIVE BASE EXCESS, ART: 1 (ref 0–2)
NEGATIVE BASE EXCESS, VEN: 0.4 MMOL/L (ref 0–2)
NITRITE, URINE: NEGATIVE
NRBC AUTOMATED: 0 PER 100 WBC
O2 DEVICE/FLOW/%: ABNORMAL
O2 SAT, VEN: 82.7 % (ref 60–85)
OPIATES, URINE: NEGATIVE
OXYCODONE SCREEN URINE: NEGATIVE
PARTIAL THROMBOPLASTIN TIME: 21 SEC (ref 20.5–30.5)
PATIENT TEMP: 37
PCO2, VEN: 50.1 MM HG (ref 39–55)
PDW BLD-RTO: 11.3 % (ref 11.8–14.4)
PH UA: 6.5 (ref 5–8)
PH VENOUS: 7.33 (ref 7.32–7.42)
PHENCYCLIDINE, URINE: NEGATIVE
PLATELET # BLD: 320 K/UL (ref 138–453)
PMV BLD AUTO: 9.6 FL (ref 8.1–13.5)
PO2, VEN: 51.9 MM HG (ref 30–50)
POC HCO3: 25.3 MMOL/L (ref 21–28)
POC LACTIC ACID: 2.27 MMOL/L (ref 0.56–1.39)
POC O2 SATURATION: 100 % (ref 94–98)
POC PCO2: 48.8 MM HG (ref 35–48)
POC PH: 7.32 (ref 7.35–7.45)
POC PO2: 581.5 MM HG (ref 83–108)
POTASSIUM SERPL-SCNC: 4.1 MMOL/L (ref 3.7–5.3)
PROTEIN UA: NEGATIVE
PROTHROMBIN TIME: 11 SEC (ref 9.1–12.3)
RBC # BLD: 5.04 M/UL (ref 4.21–5.77)
RBC UA: NORMAL /HPF (ref 0–4)
SAMPLE SITE: ABNORMAL
SODIUM BLD-SCNC: 139 MMOL/L (ref 135–144)
SPECIFIC GRAVITY UA: 1.01 (ref 1–1.03)
TEST INFORMATION: ABNORMAL
TOTAL CK: 402 U/L (ref 39–308)
TOTAL PROTEIN: 6 G/DL (ref 6.4–8.3)
TROPONIN, HIGH SENSITIVITY: 7 NG/L (ref 0–22)
TROPONIN, HIGH SENSITIVITY: 7 NG/L (ref 0–22)
TURBIDITY: CLEAR
URINE HGB: ABNORMAL
UROBILINOGEN, URINE: NORMAL
WBC # BLD: 4.7 K/UL (ref 3.5–11.3)
WBC UA: NORMAL /HPF (ref 0–5)

## 2023-01-08 PROCEDURE — 82550 ASSAY OF CK (CPK): CPT

## 2023-01-08 PROCEDURE — 2500000003 HC RX 250 WO HCPCS: Performed by: STUDENT IN AN ORGANIZED HEALTH CARE EDUCATION/TRAINING PROGRAM

## 2023-01-08 PROCEDURE — 3209999900 CT LUMBAR SPINE TRAUMA RECONSTRUCTION

## 2023-01-08 PROCEDURE — A4216 STERILE WATER/SALINE, 10 ML: HCPCS | Performed by: STUDENT IN AN ORGANIZED HEALTH CARE EDUCATION/TRAINING PROGRAM

## 2023-01-08 PROCEDURE — 5A1935Z RESPIRATORY VENTILATION, LESS THAN 24 CONSECUTIVE HOURS: ICD-10-PCS | Performed by: STUDENT IN AN ORGANIZED HEALTH CARE EDUCATION/TRAINING PROGRAM

## 2023-01-08 PROCEDURE — 2500000003 HC RX 250 WO HCPCS

## 2023-01-08 PROCEDURE — 2500000003 HC RX 250 WO HCPCS: Performed by: EMERGENCY MEDICINE

## 2023-01-08 PROCEDURE — 82947 ASSAY GLUCOSE BLOOD QUANT: CPT

## 2023-01-08 PROCEDURE — 86850 RBC ANTIBODY SCREEN: CPT

## 2023-01-08 PROCEDURE — 82565 ASSAY OF CREATININE: CPT

## 2023-01-08 PROCEDURE — 74177 CT ABD & PELVIS W/CONTRAST: CPT

## 2023-01-08 PROCEDURE — 6360000002 HC RX W HCPCS: Performed by: STUDENT IN AN ORGANIZED HEALTH CARE EDUCATION/TRAINING PROGRAM

## 2023-01-08 PROCEDURE — 80076 HEPATIC FUNCTION PANEL: CPT

## 2023-01-08 PROCEDURE — 96372 THER/PROPH/DIAG INJ SC/IM: CPT

## 2023-01-08 PROCEDURE — G0480 DRUG TEST DEF 1-7 CLASSES: HCPCS

## 2023-01-08 PROCEDURE — 85027 COMPLETE CBC AUTOMATED: CPT

## 2023-01-08 PROCEDURE — 2580000003 HC RX 258: Performed by: STUDENT IN AN ORGANIZED HEALTH CARE EDUCATION/TRAINING PROGRAM

## 2023-01-08 PROCEDURE — 6360000004 HC RX CONTRAST MEDICATION: Performed by: STUDENT IN AN ORGANIZED HEALTH CARE EDUCATION/TRAINING PROGRAM

## 2023-01-08 PROCEDURE — 84484 ASSAY OF TROPONIN QUANT: CPT

## 2023-01-08 PROCEDURE — 82805 BLOOD GASES W/O2 SATURATION: CPT

## 2023-01-08 PROCEDURE — 71045 X-RAY EXAM CHEST 1 VIEW: CPT

## 2023-01-08 PROCEDURE — 72125 CT NECK SPINE W/O DYE: CPT

## 2023-01-08 PROCEDURE — 99285 EMERGENCY DEPT VISIT HI MDM: CPT

## 2023-01-08 PROCEDURE — 84520 ASSAY OF UREA NITROGEN: CPT

## 2023-01-08 PROCEDURE — 6360000002 HC RX W HCPCS

## 2023-01-08 PROCEDURE — 83605 ASSAY OF LACTIC ACID: CPT

## 2023-01-08 PROCEDURE — 83874 ASSAY OF MYOGLOBIN: CPT

## 2023-01-08 PROCEDURE — 85730 THROMBOPLASTIN TIME PARTIAL: CPT

## 2023-01-08 PROCEDURE — 2700000000 HC OXYGEN THERAPY PER DAY

## 2023-01-08 PROCEDURE — 82803 BLOOD GASES ANY COMBINATION: CPT

## 2023-01-08 PROCEDURE — 3209999900 CT THORACIC SPINE TRAUMA RECONSTRUCTION

## 2023-01-08 PROCEDURE — 80307 DRUG TEST PRSMV CHEM ANLYZR: CPT

## 2023-01-08 PROCEDURE — 73130 X-RAY EXAM OF HAND: CPT

## 2023-01-08 PROCEDURE — 85610 PROTHROMBIN TIME: CPT

## 2023-01-08 PROCEDURE — 80051 ELECTROLYTE PANEL: CPT

## 2023-01-08 PROCEDURE — 94761 N-INVAS EAR/PLS OXIMETRY MLT: CPT

## 2023-01-08 PROCEDURE — 70450 CT HEAD/BRAIN W/O DYE: CPT

## 2023-01-08 PROCEDURE — 94002 VENT MGMT INPAT INIT DAY: CPT

## 2023-01-08 PROCEDURE — 86901 BLOOD TYPING SEROLOGIC RH(D): CPT

## 2023-01-08 PROCEDURE — 86900 BLOOD TYPING SEROLOGIC ABO: CPT

## 2023-01-08 PROCEDURE — 36600 WITHDRAWAL OF ARTERIAL BLOOD: CPT

## 2023-01-08 PROCEDURE — 6370000000 HC RX 637 (ALT 250 FOR IP): Performed by: STUDENT IN AN ORGANIZED HEALTH CARE EDUCATION/TRAINING PROGRAM

## 2023-01-08 PROCEDURE — 2000000000 HC ICU R&B

## 2023-01-08 PROCEDURE — 81001 URINALYSIS AUTO W/SCOPE: CPT

## 2023-01-08 PROCEDURE — 82140 ASSAY OF AMMONIA: CPT

## 2023-01-08 PROCEDURE — 84703 CHORIONIC GONADOTROPIN ASSAY: CPT

## 2023-01-08 PROCEDURE — 6810039001 HC L1 TRAUMA PRIORITY

## 2023-01-08 PROCEDURE — 0BH17EZ INSERTION OF ENDOTRACHEAL AIRWAY INTO TRACHEA, VIA NATURAL OR ARTIFICIAL OPENING: ICD-10-PCS | Performed by: STUDENT IN AN ORGANIZED HEALTH CARE EDUCATION/TRAINING PROGRAM

## 2023-01-08 RX ORDER — SODIUM CHLORIDE 0.9 % (FLUSH) 0.9 %
5-40 SYRINGE (ML) INJECTION PRN
Status: DISCONTINUED | OUTPATIENT
Start: 2023-01-08 | End: 2023-01-09 | Stop reason: HOSPADM

## 2023-01-08 RX ORDER — FENTANYL CITRATE 50 UG/ML
INJECTION, SOLUTION INTRAMUSCULAR; INTRAVENOUS
Status: DISPENSED
Start: 2023-01-08 | End: 2023-01-09

## 2023-01-08 RX ORDER — KETAMINE HYDROCHLORIDE 10 MG/ML
INJECTION INTRAMUSCULAR; INTRAVENOUS DAILY PRN
Status: COMPLETED | OUTPATIENT
Start: 2023-01-08 | End: 2023-01-08

## 2023-01-08 RX ORDER — SODIUM CHLORIDE 0.9 % (FLUSH) 0.9 %
5-40 SYRINGE (ML) INJECTION EVERY 12 HOURS SCHEDULED
Status: DISCONTINUED | OUTPATIENT
Start: 2023-01-08 | End: 2023-01-09 | Stop reason: HOSPADM

## 2023-01-08 RX ORDER — SODIUM CHLORIDE, SODIUM LACTATE, POTASSIUM CHLORIDE, AND CALCIUM CHLORIDE .6; .31; .03; .02 G/100ML; G/100ML; G/100ML; G/100ML
1000 INJECTION, SOLUTION INTRAVENOUS ONCE
Status: COMPLETED | OUTPATIENT
Start: 2023-01-08 | End: 2023-01-09

## 2023-01-08 RX ORDER — PROPOFOL 10 MG/ML
INJECTION, EMULSION INTRAVENOUS
Status: DISPENSED
Start: 2023-01-08 | End: 2023-01-09

## 2023-01-08 RX ORDER — PROPOFOL 10 MG/ML
5-50 INJECTION, EMULSION INTRAVENOUS CONTINUOUS
Status: DISCONTINUED | OUTPATIENT
Start: 2023-01-08 | End: 2023-01-09

## 2023-01-08 RX ORDER — ONDANSETRON 2 MG/ML
4 INJECTION INTRAMUSCULAR; INTRAVENOUS EVERY 6 HOURS PRN
Status: DISCONTINUED | OUTPATIENT
Start: 2023-01-08 | End: 2023-01-09 | Stop reason: HOSPADM

## 2023-01-08 RX ORDER — KETAMINE HYDROCHLORIDE 50 MG/ML
INJECTION, SOLUTION, CONCENTRATE INTRAMUSCULAR; INTRAVENOUS
Status: DISPENSED
Start: 2023-01-08 | End: 2023-01-09

## 2023-01-08 RX ORDER — MIDAZOLAM HYDROCHLORIDE 2 MG/2ML
3 INJECTION, SOLUTION INTRAMUSCULAR; INTRAVENOUS ONCE
Status: COMPLETED | OUTPATIENT
Start: 2023-01-08 | End: 2023-01-08

## 2023-01-08 RX ORDER — POLYETHYLENE GLYCOL 3350 17 G/17G
17 POWDER, FOR SOLUTION ORAL DAILY
Status: DISCONTINUED | OUTPATIENT
Start: 2023-01-08 | End: 2023-01-09 | Stop reason: HOSPADM

## 2023-01-08 RX ORDER — SODIUM CHLORIDE 9 MG/ML
INJECTION, SOLUTION INTRAVENOUS PRN
Status: DISCONTINUED | OUTPATIENT
Start: 2023-01-08 | End: 2023-01-09 | Stop reason: HOSPADM

## 2023-01-08 RX ORDER — ONDANSETRON 4 MG/1
4 TABLET, ORALLY DISINTEGRATING ORAL EVERY 8 HOURS PRN
Status: DISCONTINUED | OUTPATIENT
Start: 2023-01-08 | End: 2023-01-09 | Stop reason: HOSPADM

## 2023-01-08 RX ORDER — DEXMEDETOMIDINE HYDROCHLORIDE 4 UG/ML
.1-1 INJECTION, SOLUTION INTRAVENOUS CONTINUOUS
Status: DISCONTINUED | OUTPATIENT
Start: 2023-01-08 | End: 2023-01-09

## 2023-01-08 RX ORDER — HALOPERIDOL 5 MG/ML
INJECTION INTRAMUSCULAR
Status: DISPENSED
Start: 2023-01-08 | End: 2023-01-09

## 2023-01-08 RX ORDER — CHLORHEXIDINE GLUCONATE 0.12 MG/ML
15 RINSE ORAL 2 TIMES DAILY
Status: DISCONTINUED | OUTPATIENT
Start: 2023-01-08 | End: 2023-01-09 | Stop reason: HOSPADM

## 2023-01-08 RX ORDER — QUETIAPINE FUMARATE 25 MG/1
25 TABLET, FILM COATED ORAL 2 TIMES DAILY
Status: DISCONTINUED | OUTPATIENT
Start: 2023-01-08 | End: 2023-01-09 | Stop reason: HOSPADM

## 2023-01-08 RX ADMIN — KETAMINE HYDROCHLORIDE 250 MG: 10 INJECTION INTRAMUSCULAR; INTRAVENOUS at 17:34

## 2023-01-08 RX ADMIN — PROPOFOL 40 MCG/KG/MIN: 10 INJECTION, EMULSION INTRAVENOUS at 21:19

## 2023-01-08 RX ADMIN — SODIUM CHLORIDE, PRESERVATIVE FREE 10 ML: 5 INJECTION INTRAVENOUS at 19:44

## 2023-01-08 RX ADMIN — IOPAMIDOL 130 ML: 755 INJECTION, SOLUTION INTRAVENOUS at 18:19

## 2023-01-08 RX ADMIN — Medication 25 MCG/HR: at 19:29

## 2023-01-08 RX ADMIN — SODIUM CHLORIDE, POTASSIUM CHLORIDE, SODIUM LACTATE AND CALCIUM CHLORIDE 1000 ML: 600; 310; 30; 20 INJECTION, SOLUTION INTRAVENOUS at 22:30

## 2023-01-08 RX ADMIN — MIDAZOLAM 3 MG: 1 INJECTION INTRAMUSCULAR; INTRAVENOUS at 23:32

## 2023-01-08 RX ADMIN — CHLORHEXIDINE GLUCONATE 15 ML: 1.2 RINSE ORAL at 20:30

## 2023-01-08 RX ADMIN — PROPOFOL 50 MCG/KG/MIN: 10 INJECTION, EMULSION INTRAVENOUS at 18:58

## 2023-01-08 RX ADMIN — QUETIAPINE FUMARATE 25 MG: 25 TABLET ORAL at 23:34

## 2023-01-08 RX ADMIN — Medication 0.2 MG/KG/HR: at 20:20

## 2023-01-08 RX ADMIN — SODIUM CHLORIDE, PRESERVATIVE FREE 20 MG: 5 INJECTION INTRAVENOUS at 22:29

## 2023-01-08 RX ADMIN — DEXMEDETOMIDINE HYDROCHLORIDE 0.2 MCG/KG/HR: 4 INJECTION, SOLUTION INTRAVENOUS at 20:13

## 2023-01-08 ASSESSMENT — PULMONARY FUNCTION TESTS
PIF_VALUE: 25
PIF_VALUE: 23

## 2023-01-08 ASSESSMENT — PAIN SCALES - GENERAL: PAINLEVEL_OUTOF10: 10

## 2023-01-08 NOTE — ED NOTES
No step off or deformities noted no bruising on buttocks.       Lamine Tracy, RN  01/08/23 [address] [facility] [place], [place]  01/08/23 8755
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
## 2023-01-08 NOTE — ED NOTES
100 mg succs given by [facility].       Halina Benítez RN  01/08/23 [address], RN  01/08/23 5084
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
## 2023-01-08 NOTE — ED NOTES
Propofol 20 mcg/kg started by [facility], RN  01/08/23 [address], RN  01/08/23 [address], RN  01/08/23 1750

## 2023-01-08 NOTE — ED NOTES
Waiting for security, unable to go to CT scan, pt combative.      Sheri Carney, MITCH  01/08/23 9491
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
## 2023-01-08 NOTE — LETTER
[facility]  [address] [address]  Phone: [phone]        January 9, 2023     Patient: Joaquin Lloyd   YOB: 1995   Date of Visit: 1/8/2023       To Whom It May Concern: It is my medical opinion that Mandy Damian may return to full duty immediately with no restrictions. Please excuse for missed work on 1/8 and 1/9 as he was being evaluated. If you have any questions or concerns, please don't hesitate to call.     Sincerely,      Darryle Eaton, MD
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
## 2023-01-08 NOTE — ED NOTES
Pt intubated by Dr Ji Ruiz, 7 1/2 ET tube 22 cm at teeth. Positive color change, bilateral breath sounds.       Siomara Mosquera RN  01/08/23 8860

## 2023-01-08 NOTE — ED NOTES
150 mg ketamine given IM by Edis Carbajal RN per Dr. Sheri Fan verbal orders.      John Reyes RN  01/08/23 1453

## 2023-01-08 NOTE — ED NOTES
250 mg Ketamine IM given by Claudio Araujo and [facility] RN in bilateral thighs per Dr. Mabel Encinas verbal orders.       Sherman Kam RN  01/08/23 [address] [place] [address], RN  01/08/23 3759

## 2023-01-09 ENCOUNTER — APPOINTMENT (OUTPATIENT)
Dept: GENERAL RADIOLOGY | Age: 28
DRG: 057 | End: 2023-01-09
Payer: MEDICAID

## 2023-01-09 VITALS
TEMPERATURE: 97.5 F | RESPIRATION RATE: 21 BRPM | HEIGHT: 72 IN | SYSTOLIC BLOOD PRESSURE: 134 MMHG | WEIGHT: 147.93 LBS | HEART RATE: 84 BPM | DIASTOLIC BLOOD PRESSURE: 76 MMHG | BODY MASS INDEX: 20.04 KG/M2 | OXYGEN SATURATION: 99 %

## 2023-01-09 LAB
ABSOLUTE EOS #: 0.06 K/UL (ref 0–0.44)
ABSOLUTE IMMATURE GRANULOCYTE: <0.03 K/UL (ref 0–0.3)
ABSOLUTE LYMPH #: 2.35 K/UL (ref 1.1–3.7)
ABSOLUTE MONO #: 0.42 K/UL (ref 0.1–1.2)
ALLEN TEST: POSITIVE
ANION GAP SERPL CALCULATED.3IONS-SCNC: 10 MMOL/L (ref 9–17)
BASOPHILS # BLD: 0 % (ref 0–2)
BASOPHILS ABSOLUTE: <0.03 K/UL (ref 0–0.2)
BUN BLDV-MCNC: 9 MG/DL (ref 6–20)
CALCIUM SERPL-MCNC: 8.7 MG/DL (ref 8.6–10.4)
CHLORIDE BLD-SCNC: 109 MMOL/L (ref 98–107)
CO2: 23 MMOL/L (ref 20–31)
CREAT SERPL-MCNC: 0.81 MG/DL (ref 0.7–1.2)
EOSINOPHILS RELATIVE PERCENT: 1 % (ref 1–4)
FIO2: 30
GFR SERPL CREATININE-BSD FRML MDRD: >60 ML/MIN/1.73M2
GLUCOSE BLD-MCNC: 79 MG/DL (ref 74–100)
GLUCOSE BLD-MCNC: 82 MG/DL (ref 70–99)
HCT VFR BLD CALC: 39.7 % (ref 40.7–50.3)
HEMOGLOBIN: 13.6 G/DL (ref 13–17)
IMMATURE GRANULOCYTES: 0 %
LYMPHOCYTES # BLD: 41 % (ref 24–43)
MCH RBC QN AUTO: 32.2 PG (ref 25.2–33.5)
MCHC RBC AUTO-ENTMCNC: 34.3 G/DL (ref 28.4–34.8)
MCV RBC AUTO: 93.9 FL (ref 82.6–102.9)
MODE: ABNORMAL
MONOCYTES # BLD: 7 % (ref 3–12)
NEGATIVE BASE EXCESS, ART: 1 (ref 0–2)
NRBC AUTOMATED: 0 PER 100 WBC
O2 DEVICE/FLOW/%: ABNORMAL
PDW BLD-RTO: 11.7 % (ref 11.8–14.4)
PLATELET # BLD: 244 K/UL (ref 138–453)
PMV BLD AUTO: 9.5 FL (ref 8.1–13.5)
POC HCO3: 22.4 MMOL/L (ref 21–28)
POC LACTIC ACID: 0.6 MMOL/L (ref 0.56–1.39)
POC O2 SATURATION: 99 % (ref 94–98)
POC PCO2: 34.4 MM HG (ref 35–48)
POC PH: 7.42 (ref 7.35–7.45)
POC PO2: 129 MM HG (ref 83–108)
POTASSIUM SERPL-SCNC: 4.3 MMOL/L (ref 3.7–5.3)
RBC # BLD: 4.23 M/UL (ref 4.21–5.77)
SAMPLE SITE: ABNORMAL
SEG NEUTROPHILS: 50 % (ref 36–65)
SEGMENTED NEUTROPHILS ABSOLUTE COUNT: 2.9 K/UL (ref 1.5–8.1)
SODIUM BLD-SCNC: 142 MMOL/L (ref 135–144)
WBC # BLD: 5.8 K/UL (ref 3.5–11.3)

## 2023-01-09 PROCEDURE — 94761 N-INVAS EAR/PLS OXIMETRY MLT: CPT

## 2023-01-09 PROCEDURE — 82947 ASSAY GLUCOSE BLOOD QUANT: CPT

## 2023-01-09 PROCEDURE — 94003 VENT MGMT INPAT SUBQ DAY: CPT

## 2023-01-09 PROCEDURE — 2580000003 HC RX 258: Performed by: STUDENT IN AN ORGANIZED HEALTH CARE EDUCATION/TRAINING PROGRAM

## 2023-01-09 PROCEDURE — 80048 BASIC METABOLIC PNL TOTAL CA: CPT

## 2023-01-09 PROCEDURE — 36415 COLL VENOUS BLD VENIPUNCTURE: CPT

## 2023-01-09 PROCEDURE — 73080 X-RAY EXAM OF ELBOW: CPT

## 2023-01-09 PROCEDURE — 6370000000 HC RX 637 (ALT 250 FOR IP): Performed by: STUDENT IN AN ORGANIZED HEALTH CARE EDUCATION/TRAINING PROGRAM

## 2023-01-09 PROCEDURE — 2700000000 HC OXYGEN THERAPY PER DAY

## 2023-01-09 PROCEDURE — 2500000003 HC RX 250 WO HCPCS: Performed by: STUDENT IN AN ORGANIZED HEALTH CARE EDUCATION/TRAINING PROGRAM

## 2023-01-09 PROCEDURE — 82803 BLOOD GASES ANY COMBINATION: CPT

## 2023-01-09 PROCEDURE — 71045 X-RAY EXAM CHEST 1 VIEW: CPT

## 2023-01-09 PROCEDURE — 85025 COMPLETE CBC W/AUTO DIFF WBC: CPT

## 2023-01-09 PROCEDURE — 6360000002 HC RX W HCPCS

## 2023-01-09 PROCEDURE — 36600 WITHDRAWAL OF ARTERIAL BLOOD: CPT

## 2023-01-09 PROCEDURE — 83605 ASSAY OF LACTIC ACID: CPT

## 2023-01-09 RX ORDER — GINSENG 100 MG
CAPSULE ORAL 2 TIMES DAILY
Status: DISCONTINUED | OUTPATIENT
Start: 2023-01-09 | End: 2023-01-09 | Stop reason: HOSPADM

## 2023-01-09 RX ORDER — SODIUM CHLORIDE, SODIUM LACTATE, POTASSIUM CHLORIDE, CALCIUM CHLORIDE 600; 310; 30; 20 MG/100ML; MG/100ML; MG/100ML; MG/100ML
INJECTION, SOLUTION INTRAVENOUS CONTINUOUS
Status: DISCONTINUED | OUTPATIENT
Start: 2023-01-09 | End: 2023-01-09 | Stop reason: HOSPADM

## 2023-01-09 RX ORDER — PROPOFOL 10 MG/ML
INJECTION, EMULSION INTRAVENOUS
Status: COMPLETED
Start: 2023-01-09 | End: 2023-01-09

## 2023-01-09 RX ADMIN — PROPOFOL 35 MCG/KG/MIN: 10 INJECTION, EMULSION INTRAVENOUS at 03:34

## 2023-01-09 RX ADMIN — SODIUM CHLORIDE, POTASSIUM CHLORIDE, SODIUM LACTATE AND CALCIUM CHLORIDE: 600; 310; 30; 20 INJECTION, SOLUTION INTRAVENOUS at 09:02

## 2023-01-09 RX ADMIN — SODIUM CHLORIDE, PRESERVATIVE FREE 20 MG: 5 INJECTION INTRAVENOUS at 09:00

## 2023-01-09 RX ADMIN — BACITRACIN: 500 OINTMENT TOPICAL at 11:06

## 2023-01-09 RX ADMIN — QUETIAPINE FUMARATE 25 MG: 25 TABLET ORAL at 08:06

## 2023-01-09 ASSESSMENT — PULMONARY FUNCTION TESTS
PIF_VALUE: 22
PIF_VALUE: 25
PIF_VALUE: 24
PIF_VALUE: 18

## 2023-01-09 NOTE — DISCHARGE INSTRUCTIONS
General Surgery Patient Discharge Instructions    Discharged To: Home    RESUME ACTIVITY:       BATHING:  Ok to shower. DRIVING: No driving while on pain medication, in pain, or until seen at follow up visit. RETURN TO WORK:   No restrictions for returning to work    WALKING:    Yes    LIFTING: No restrictions for lifting    DIET:   [id number] [address] Cisneros to resume regular diet. MEDICATIONS: Take medications as prescribed by physician. Tylenol can be taken every 6 hours. Ibuprofen can be taken every 6 hours. It is recommended you alternate the two every three hours. Example pain medication schedule:  - 9am: Tylenol  - 12 pm/noon: Ibuprofen  - 3pm: Tylenol  - 6pm: Ibuprofen      FOLLOW UP: Call and make appointment to follow up with psychiatry as soon as possible. You may follow up in trauma clinic as needed.
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
## 2023-01-09 NOTE — PROGRESS NOTES
All IV's were removed, discharge notes were given to patient. Patient is leaving with her girlfriend.

## 2023-01-09 NOTE — PROGRESS NOTES
Patient placed on CPAP with pressure support of 8, Dr. Valentino Bearded at bedside.      01/09/23 0812   NICU Vent Information   Vent Type Servo i   Vent Mode (S)  CPAP   Vt (Set, mL) 500 mL   Vt (Measured) 0 mL   Spontaneous  mL   Resp Rate (Set) 18 bmp   Rate Measured 30 br/min   Minute Volume (L/min) 15.1 Liters   Pressure Support (S)  8 cmH20   FiO2  30 %   Peak Inspiratory Pressure (cmH2O) 24 cmH2O   I:E Ratio 1:1.75   Sensitivity 4   PEEP/CPAP (cmH2O) 8   I Time/ I Time % 0.85 s   Mean Airway Pressure (cmH2O) 11.5 cmH20

## 2023-01-09 NOTE — PROGRESS NOTES
Physical Therapy Cancel Note      DATE: 2023    NAME: Rica Graves  MRN: [id number]   : 1995      Patient not seen this date for Physical Therapy due to:    Patient independent with functional mobility. Will defer PT evaluation at this time. Please reorder PT if future needs arise.        Electronically signed by Anthony Meza PT on 2023 at 1:10 PM

## 2023-01-09 NOTE — PLAN OF CARE
Problem: Respiratory - Adult  Goal: Achieves optimal ventilation and oxygenation  Outcome: Progressing  Flowsheets (Taken 1/9/2023 0350)  Achieves optimal ventilation and oxygenation:   Assess for changes in respiratory status   Assess for changes in mentation and behavior   Position to facilitate oxygenation and minimize respiratory effort   Oxygen supplementation based on oxygen saturation or arterial blood gases   Encourage broncho-pulmonary hygiene including cough, deep breathe, incentive spirometry   Initiate smoking cessation protocol as indicated   Assess the need for suctioning and aspirate as needed   Assess and instruct to report shortness of breath or any respiratory difficulty   Respiratory therapy support as indicated
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
## 2023-01-09 NOTE — PROGRESS NOTES
Patient extubated on 4L NC with DR. Garvey at bedside, tolerating well.         Ghada Cullen   8:27 AM

## 2023-01-09 NOTE — PLAN OF CARE
Problem: Safety - Medical Restraint  Goal: Remains free of injury from restraints (Restraint for Interference with Medical Device)  Description: INTERVENTIONS:  1. Determine that other, less restrictive measures have been tried or would not be effective before applying the restraint  2. Evaluate the patient's condition at the time of restraint application  3. Inform patient/family regarding the reason for restraint  4.  Q2H: Monitor safety, psychosocial status, comfort, nutrition and hydration  1/9/2023 1409 by Dale Crouch RN  Outcome: Completed  1/9/2023 1327 by Samuel Guzmán RN  Outcome: Progressing  1/9/2023 1327 by Samuel Guzmán RN  Outcome: Progressing  Flowsheets (Taken 1/9/2023 0800)  Remains free of injury from restraints (restraint for interference with medical device): Every 2 hours: Monitor safety, psychosocial status, comfort, nutrition and hydration  1/9/2023 0409 by Zay Darling RN  Outcome: Not Progressing  Flowsheets (Taken 1/8/2023 2000)  Remains free of injury from restraints (restraint for interference with medical device):   Determine that other, less restrictive measures have been tried or would not be effective before applying the restraint   Evaluate the patient's condition at the time of restraint application   Every 2 hours: Monitor safety, psychosocial status, comfort, nutrition and hydration   Inform patient/family regarding the reason for restraint     Problem: Discharge Planning  Goal: Discharge to home or other facility with appropriate resources  1/9/2023 1409 by Dale Crouch RN  Outcome: Completed  1/9/2023 1327 by Samuel Guzmán RN  Outcome: Progressing  1/9/2023 1327 by Samuel Guzmán RN  Outcome: Progressing  Flowsheets (Taken 1/9/2023 0800)  Discharge to home or other facility with appropriate resources: Identify barriers to discharge with patient and caregiver  1/9/2023 0409 by Zay Darling RN  Outcome: Progressing  Flowsheets (Taken 1/8/2023 2000)  Discharge to home or other facility with appropriate resources:   Identify barriers to discharge with patient and caregiver   Arrange for needed discharge resources and transportation as appropriate   Identify discharge learning needs (meds, wound care, etc)   Arrange for interpreters to assist at discharge as needed   Refer to discharge planning if patient needs post-hospital services based on physician order or complex needs related to functional status, cognitive ability or social support system     Problem: Pain  Goal: Verbalizes/displays adequate comfort level or baseline comfort level  1/9/2023 1409 by Dalila Valentin RN  Outcome: Completed  1/9/2023 1327 by Kelton Richardson RN  Outcome: Progressing  1/9/2023 1327 by Kelton Richardson RN  Outcome: Progressing  1/9/2023 0409 by Ilia Miller RN  Outcome: Progressing     Problem: Confusion  Goal: Confusion, delirium, dementia, or psychosis is improved or at baseline  Description: INTERVENTIONS:  1. Assess for possible contributors to thought disturbance, including medications, impaired vision or hearing, underlying metabolic abnormalities, dehydration, psychiatric diagnoses, and notify attending LIP  2. [place] high risk fall precautions, as indicated  3. Provide frequent short contacts to provide reality reorientation, refocusing and direction  4. Decrease environmental stimuli, including noise as appropriate  5. Monitor and intervene to maintain adequate nutrition, hydration, elimination, sleep and activity  6. If unable to ensure safety without constant attention obtain sitter and review sitter guidelines with assigned personnel  7.  Initiate Psychosocial CNS and Spiritual Care consult, as indicated  1/9/2023 1409 by Dalila Valentin RN  Outcome: Completed  1/9/2023 1327 by Kelton Richardson RN  Outcome: Progressing  1/9/2023 1327 by Kelton Richardson RN  Outcome: Progressing  1/9/2023 0409 by Ilia Miller RN  Outcome: Not Progressing     Problem: Respiratory - Adult  Goal: Achieves optimal ventilation and oxygenation  1/9/2023 1409 by Susan Elam RN  Outcome: Completed  1/9/2023 1327 by Joaquin Maza RN  Outcome: Progressing  1/9/2023 1327 by Joaquin Maza RN  Outcome: Progressing  Flowsheets (Taken 1/9/2023 0800)  Achieves optimal ventilation and oxygenation:   Assess for changes in respiratory status   Assess for changes in mentation and behavior  1/9/2023 0409 by Adelia Peterson RN  Outcome: Progressing  1/9/2023 0350 by Rahel Mooney RCP  Outcome: Progressing  Flowsheets (Taken 1/9/2023 0350)  Achieves optimal ventilation and oxygenation:   Assess for changes in respiratory status   Assess for changes in mentation and behavior   Position to facilitate oxygenation and minimize respiratory effort   Oxygen supplementation based on oxygen saturation or arterial blood gases   Encourage broncho-pulmonary hygiene including cough, deep breathe, incentive spirometry   Initiate smoking cessation protocol as indicated   Assess the need for suctioning and aspirate as needed   Assess and instruct to report shortness of breath or any respiratory difficulty   Respiratory therapy support as indicated     Problem: Safety - Adult  Goal: Free from fall injury  1/9/2023 1409 by Susan Elam RN  Outcome: Completed  1/9/2023 1327 by Joaquin Maza RN  Outcome: Progressing  1/9/2023 1327 by Joaquin Maza RN  Outcome: Progressing     Problem: Safety - Medical Restraint  Goal: Remains free of injury from restraints (Restraint for Interference with Medical Device)  Description: INTERVENTIONS:  1. Determine that other, less restrictive measures have been tried or would not be effective before applying the restraint  2. Evaluate the patient's condition at the time of restraint application  3. Inform patient/family regarding the reason for restraint  4.  Q2H: Monitor safety, psychosocial status, comfort, nutrition and hydration  1/9/2023 1409 by Susan Elam RN  Outcome: Completed  1/9/2023 1327 by Colleen Naylor RN  Outcome: Progressing  1/9/2023 1327 by Colleen Naylor RN  Outcome: Progressing  Flowsheets (Taken 1/9/2023 0800)  Remains free of injury from restraints (restraint for interference with medical device): Every 2 hours: Monitor safety, psychosocial status, comfort, nutrition and hydration  1/9/2023 0409 by Lobo Yusuf RN  Outcome: Not Progressing  Flowsheets (Taken 1/8/2023 2000)  Remains free of injury from restraints (restraint for interference with medical device):   Determine that other, less restrictive measures have been tried or would not be effective before applying the restraint   Evaluate the patient's condition at the time of restraint application   Every 2 hours: Monitor safety, psychosocial status, comfort, nutrition and hydration   Inform patient/family regarding the reason for restraint     Problem: Confusion  Goal: Confusion, delirium, dementia, or psychosis is improved or at baseline  Description: INTERVENTIONS:  1. Assess for possible contributors to thought disturbance, including medications, impaired vision or hearing, underlying metabolic abnormalities, dehydration, psychiatric diagnoses, and notify attending LIP  2. [place] high risk fall precautions, as indicated  3. Provide frequent short contacts to provide reality reorientation, refocusing and direction  4. Decrease environmental stimuli, including noise as appropriate  5. Monitor and intervene to maintain adequate nutrition, hydration, elimination, sleep and activity  6. If unable to ensure safety without constant attention obtain sitter and review sitter guidelines with assigned personnel  7.  Initiate Psychosocial CNS and Spiritual Care consult, as indicated  1/9/2023 1409 by Bel Lombardi RN  Outcome: Completed  1/9/2023 1327 by Colleen Naylor RN  Outcome: Progressing  1/9/2023 1327 by Colleen Naylor RN  Outcome: Progressing  1/9/2023 0409 by Lobo Yusuf RN  Outcome: Not Progressing

## 2023-01-09 NOTE — PROGRESS NOTES
[facility])  Occupational Therapy Not Seen Note    DATE: 2023    NAME: Elian Victor  MRN: [id number]   : 1995      Patient not seen this date for Occupational Therapy due to:    Spoke with pt and mother regarding possible OT needs. Pt observed steady walking around room IND and moving all extremities without difficulty. Patient independent with ADLs and functional tasks with no acute OT needs. Will defer OT evaluation at this time. Please reorder OT if future needs arise.        Electronically signed by DIANA Bolton on 2023 at 1:05 PM

## 2023-01-09 NOTE — PLAN OF CARE
Problem: Discharge Planning  Goal: Discharge to home or other facility with appropriate resources  Outcome: Progressing  Flowsheets (Taken 1/8/2023 2000)  Discharge to home or other facility with appropriate resources:   Identify barriers to discharge with patient and caregiver   Arrange for needed discharge resources and transportation as appropriate   Identify discharge learning needs (meds, wound care, etc)   Arrange for interpreters to assist at discharge as needed   Refer to discharge planning if patient needs post-hospital services based on physician order or complex needs related to functional status, cognitive ability or social support system     Problem: Pain  Goal: Verbalizes/displays adequate comfort level or baseline comfort level  Outcome: Progressing     Problem: Safety - Medical Restraint  Goal: Remains free of injury from restraints (Restraint for Interference with Medical Device)  Description: INTERVENTIONS:  1. Determine that other, less restrictive measures have been tried or would not be effective before applying the restraint  2. Evaluate the patient's condition at the time of restraint application  3. Inform patient/family regarding the reason for restraint  4. Q2H: Monitor safety, psychosocial status, comfort, nutrition and hydration  Outcome: Not Progressing  Flowsheets (Taken 1/8/2023 2000)  Remains free of injury from restraints (restraint for interference with medical device):   Determine that other, less restrictive measures have been tried or would not be effective before applying the restraint   Evaluate the patient's condition at the time of restraint application   Every 2 hours: Monitor safety, psychosocial status, comfort, nutrition and hydration   Inform patient/family regarding the reason for restraint     Problem: Confusion  Goal: Confusion, delirium, dementia, or psychosis is improved or at baseline  Description: INTERVENTIONS:  1.  Assess for possible contributors to thought disturbance, including medications, impaired vision or hearing, underlying metabolic abnormalities, dehydration, psychiatric diagnoses, and notify attending LIP  2. [place] high risk fall precautions, as indicated  3. Provide frequent short contacts to provide reality reorientation, refocusing and direction  4. Decrease environmental stimuli, including noise as appropriate  5. Monitor and intervene to maintain adequate nutrition, hydration, elimination, sleep and activity  6. If unable to ensure safety without constant attention obtain sitter and review sitter guidelines with assigned personnel  7.  Initiate Psychosocial CNS and Spiritual Care consult, as indicated  Outcome: Not Progressing
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
## 2023-01-09 NOTE — PLAN OF CARE
Problem: Safety - Medical Restraint  Goal: Remains free of injury from restraints (Restraint for Interference with Medical Device)  Description: INTERVENTIONS:  1. Determine that other, less restrictive measures have been tried or would not be effective before applying the restraint  2. Evaluate the patient's condition at the time of restraint application  3. Inform patient/family regarding the reason for restraint  4.  Q2H: Monitor safety, psychosocial status, comfort, nutrition and hydration  1/9/2023 1327 by Jono Wick RN  Outcome: Progressing  1/9/2023 1327 by Jono Wick RN  Outcome: Progressing  Flowsheets (Taken 1/9/2023 0800)  Remains free of injury from restraints (restraint for interference with medical device): Every 2 hours: Monitor safety, psychosocial status, comfort, nutrition and hydration  1/9/2023 0409 by Deniz Stokes RN  Outcome: Not Progressing  Flowsheets (Taken 1/8/2023 2000)  Remains free of injury from restraints (restraint for interference with medical device):   Determine that other, less restrictive measures have been tried or would not be effective before applying the restraint   Evaluate the patient's condition at the time of restraint application   Every 2 hours: Monitor safety, psychosocial status, comfort, nutrition and hydration   Inform patient/family regarding the reason for restraint     Problem: Discharge Planning  Goal: Discharge to home or other facility with appropriate resources  1/9/2023 1327 by Jono Wick RN  Outcome: Progressing  1/9/2023 1327 by Jono Wick RN  Outcome: Progressing  Flowsheets (Taken 1/9/2023 0800)  Discharge to home or other facility with appropriate resources: Identify barriers to discharge with patient and caregiver  1/9/2023 0409 by Deniz Stokes RN  Outcome: Progressing  Flowsheets (Taken 1/8/2023 2000)  Discharge to home or other facility with appropriate resources:   Identify barriers to discharge with patient and caregiver   Arrange for needed discharge resources and transportation as appropriate   Identify discharge learning needs (meds, wound care, etc)   Arrange for interpreters to assist at discharge as needed   Refer to discharge planning if patient needs post-hospital services based on physician order or complex needs related to functional status, cognitive ability or social support system     Problem: Pain  Goal: Verbalizes/displays adequate comfort level or baseline comfort level  1/9/2023 1327 by Jennifer Romero RN  Outcome: Progressing  1/9/2023 1327 by Jennifer Romero RN  Outcome: Progressing  1/9/2023 0409 by Nilsa Case RN  Outcome: Progressing     Problem: Confusion  Goal: Confusion, delirium, dementia, or psychosis is improved or at baseline  Description: INTERVENTIONS:  1. Assess for possible contributors to thought disturbance, including medications, impaired vision or hearing, underlying metabolic abnormalities, dehydration, psychiatric diagnoses, and notify attending LIP  2. [place] high risk fall precautions, as indicated  3. Provide frequent short contacts to provide reality reorientation, refocusing and direction  4. Decrease environmental stimuli, including noise as appropriate  5. Monitor and intervene to maintain adequate nutrition, hydration, elimination, sleep and activity  6. If unable to ensure safety without constant attention obtain sitter and review sitter guidelines with assigned personnel  7.  Initiate Psychosocial CNS and Spiritual Care consult, as indicated  1/9/2023 1327 by Jennifer Romero RN  Outcome: Progressing  1/9/2023 1327 by Jennifer Romero RN  Outcome: Progressing  1/9/2023 0409 by Nilsa Case RN  Outcome: Not Progressing     Problem: Respiratory - Adult  Goal: Achieves optimal ventilation and oxygenation  1/9/2023 1327 by Jennifer Romero RN  Outcome: Progressing  1/9/2023 1327 by Jennfier Romero RN  Outcome: Progressing  Flowsheets (Taken 1/9/2023 0800)  Reed Mendez optimal ventilation and oxygenation:   Assess for changes in respiratory status   Assess for changes in mentation and behavior  1/9/2023 0409 by Avelina Kurtz RN  Outcome: Progressing  1/9/2023 0350 by Geronimo Prado RCP  Outcome: Progressing  Flowsheets (Taken 1/9/2023 0350)  Achieves optimal ventilation and oxygenation:   Assess for changes in respiratory status   Assess for changes in mentation and behavior   Position to facilitate oxygenation and minimize respiratory effort   Oxygen supplementation based on oxygen saturation or arterial blood gases   Encourage broncho-pulmonary hygiene including cough, deep breathe, incentive spirometry   Initiate smoking cessation protocol as indicated   Assess the need for suctioning and aspirate as needed   Assess and instruct to report shortness of breath or any respiratory difficulty   Respiratory therapy support as indicated     Problem: Safety - Adult  Goal: Free from fall injury  1/9/2023 1327 by Génesis Garcia RN  Outcome: Progressing  1/9/2023 1327 by Génesis Garcia RN  Outcome: Progressing     Problem: Safety - Medical Restraint  Goal: Remains free of injury from restraints (Restraint for Interference with Medical Device)  Description: INTERVENTIONS:  1. Determine that other, less restrictive measures have been tried or would not be effective before applying the restraint  2. Evaluate the patient's condition at the time of restraint application  3. Inform patient/family regarding the reason for restraint  4.  Q2H: Monitor safety, psychosocial status, comfort, nutrition and hydration  1/9/2023 1327 by Génesis Garcia RN  Outcome: Progressing  1/9/2023 1327 by Génesis Garcia RN  Outcome: Progressing  Flowsheets (Taken 1/9/2023 0800)  Remains free of injury from restraints (restraint for interference with medical device): Every 2 hours: Monitor safety, psychosocial status, comfort, nutrition and hydration  1/9/2023 0409 by Avelina Kurtz RN  Outcome: Not Progressing  Flowsheets (Taken 1/8/2023 2000)  Remains free of injury from restraints (restraint for interference with medical device):   Determine that other, less restrictive measures have been tried or would not be effective before applying the restraint   Evaluate the patient's condition at the time of restraint application   Every 2 hours: Monitor safety, psychosocial status, comfort, nutrition and hydration   Inform patient/family regarding the reason for restraint     Problem: Confusion  Goal: Confusion, delirium, dementia, or psychosis is improved or at baseline  Description: INTERVENTIONS:  1. Assess for possible contributors to thought disturbance, including medications, impaired vision or hearing, underlying metabolic abnormalities, dehydration, psychiatric diagnoses, and notify attending LIP  2. [place] high risk fall precautions, as indicated  3. Provide frequent short contacts to provide reality reorientation, refocusing and direction  4. Decrease environmental stimuli, including noise as appropriate  5. Monitor and intervene to maintain adequate nutrition, hydration, elimination, sleep and activity  6. If unable to ensure safety without constant attention obtain sitter and review sitter guidelines with assigned personnel  7.  Initiate Psychosocial CNS and Spiritual Care consult, as indicated  1/9/2023 1327 by Chaitanya Rick RN  Outcome: Progressing  1/9/2023 1327 by Chaitanya Rick RN  Outcome: Progressing  1/9/2023 0409 by Audelia Hays RN  Outcome: Not Progressing

## 2023-01-09 NOTE — PROGRESS NOTES
Patient agitated, combative, pulling at tubes/lines. Dr Heide Owens at bedside. Verbal order for propofol bolus received and administered via pump. Propofol continuous infusion increased. Dr Yary Zhao also informed of event. Plan to keep patient sedated with propofol and wean off precedex.

## 2023-05-11 ENCOUNTER — APPOINTMENT (OUTPATIENT)
Dept: GENERAL RADIOLOGY | Age: 28
End: 2023-05-11
Payer: OTHER MISCELLANEOUS

## 2023-05-11 ENCOUNTER — HOSPITAL ENCOUNTER (EMERGENCY)
Age: 28
Discharge: HOME | End: 2023-05-11
Attending: EMERGENCY MEDICINE
Payer: OTHER MISCELLANEOUS

## 2023-05-11 VITALS
SYSTOLIC BLOOD PRESSURE: 98 MMHG | TEMPERATURE: 98.4 F | HEART RATE: 84 BPM | OXYGEN SATURATION: 99 % | DIASTOLIC BLOOD PRESSURE: 64 MMHG

## 2023-05-11 DIAGNOSIS — M62.830 SPASM OF LUMBAR PARASPINOUS MUSCLE: ICD-10-CM
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
## 2023-05-11 DIAGNOSIS — S09.90XA CLOSED HEAD INJURY, INITIAL ENCOUNTER: ICD-10-CM

## 2023-05-11 PROCEDURE — 72040 X-RAY EXAM NECK SPINE 2-3 VW: CPT

## 2023-05-11 PROCEDURE — 99283 EMERGENCY DEPT VISIT LOW MDM: CPT

## 2023-05-11 RX ORDER — ACETAMINOPHEN 500 MG
1000 TABLET ORAL ONCE
Status: DISCONTINUED | OUTPATIENT
Start: 2023-05-11 | End: 2023-05-11 | Stop reason: HOSPADM

## 2023-05-11 RX ORDER — CYCLOBENZAPRINE HCL 5 MG
5 TABLET ORAL 3 TIMES DAILY PRN
Qty: 30 TABLET | Refills: 0 | Status: SHIPPED | OUTPATIENT
Start: 2023-05-11 | End: 2023-05-21

## 2023-05-11 RX ORDER — KETOROLAC TROMETHAMINE 15 MG/ML
15 INJECTION, SOLUTION INTRAMUSCULAR; INTRAVENOUS ONCE
Status: DISCONTINUED | OUTPATIENT
Start: 2023-05-11 | End: 2023-05-11 | Stop reason: HOSPADM

## 2023-05-11 ASSESSMENT — ENCOUNTER SYMPTOMS
SINUS PAIN: 0
SHORTNESS OF BREATH: 0
VOMITING: 0
CHEST TIGHTNESS: 0
NAUSEA: 0
COUGH: 0
ABDOMINAL PAIN: 0
PHOTOPHOBIA: 0
BACK PAIN: 1
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
## 2023-05-11 NOTE — DISCHARGE INSTRUCTIONS
Call the phone number provided to establish care with PCP. Do not take any medication that contains aspirin / ibuprofen or blood thinning properties until seen by your physician. Do not do any sporting activity (running, playing basketball / football, etc) until you are seen by your physician. For persistent headache you can try sitting in a cool, dark room and try taking 1 - 2 tabs (25 - 50 mg) of diphenhydramine (Benadryl) to help you relax. For pain use acetaminophen (Tylenol) or ibuprofen (Motrin / Advil), unless prescribed medications that have acetaminophen or ibuprofen (or similar medications) in it. You can take over the counter acetaminophen tablets (1 - 2 tablets of the 500-mg strength every 6 hours) or ibuprofen tablets (2 tablets every 4 hours). Soak in a hot shower or bath tub. You will have more aches and pains tomorrow, but should feel better in several days. You can take Flexeril up to 3 times daily, every 8 hours as needed for muscle spasm and soreness. WARNING:  May cause drowsiness. May impair ability to operate vehicles or machinery. Do not use in combination with alcohol.        PLEASE RETURN TO THE EMERGENCY DEPARTMENT IMMEDIATELY for worsening of pain, decrease sensation to arms or legs, inability to move arms or legs, shortness of breath, severe chest pain, excessive nausea or vomiting, notice any bruising to your abdomen or have increase in abdominal pain, or if you develop any concerning symptoms such as: high fever not relieved by acetaminophen (Tylenol) and/or ibuprofen (Motrin / Advil), chills, feeling of your heart fluttering or racing, persistent nausea and/or vomiting, vomiting up blood, blood in your stool, loss of consciousness, numbness, weakness or tingling in the arms or legs or change in color of the extremities, changes in mental status, persistent headache, blurry vision, loss of bladder / bowel control, unable to follow up with your physician, or other any
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
## 2023-11-21 ENCOUNTER — OFFICE VISIT (OUTPATIENT)
Dept: FAMILY MEDICINE CLINIC | Age: 28
End: 2023-11-21
Payer: MEDICAID

## 2023-11-21 VITALS
HEART RATE: 96 BPM | OXYGEN SATURATION: 99 % | DIASTOLIC BLOOD PRESSURE: 72 MMHG | TEMPERATURE: 98.2 F | HEIGHT: 72 IN | SYSTOLIC BLOOD PRESSURE: 122 MMHG | RESPIRATION RATE: 16 BRPM | WEIGHT: 156 LBS | BODY MASS INDEX: 21.13 KG/M2

## 2023-11-21 DIAGNOSIS — F32.A DEPRESSION, UNSPECIFIED DEPRESSION TYPE: Primary | ICD-10-CM

## 2023-11-21 PROCEDURE — G8420 CALC BMI NORM PARAMETERS: HCPCS

## 2023-11-21 PROCEDURE — 4004F PT TOBACCO SCREEN RCVD TLK: CPT

## 2023-11-21 PROCEDURE — G8427 DOCREV CUR MEDS BY ELIG CLIN: HCPCS

## 2023-11-21 PROCEDURE — 99213 OFFICE O/P EST LOW 20 MIN: CPT

## 2023-11-21 PROCEDURE — G8484 FLU IMMUNIZE NO ADMIN: HCPCS

## 2023-11-21 RX ORDER — TRAZODONE HYDROCHLORIDE 100 MG/1
100 TABLET ORAL
COMMUNITY
End: 2023-11-21 | Stop reason: SDUPTHER

## 2023-11-21 RX ORDER — TRAZODONE HYDROCHLORIDE 100 MG/1
100 TABLET ORAL NIGHTLY
Qty: 30 TABLET | Refills: 2 | Status: SHIPPED | OUTPATIENT
Start: 2023-11-21

## 2023-11-21 RX ORDER — CITALOPRAM HYDROBROMIDE 10 MG/1
10 TABLET ORAL DAILY
COMMUNITY
End: 2023-11-21 | Stop reason: SDUPTHER

## 2023-11-21 RX ORDER — CITALOPRAM HYDROBROMIDE 10 MG/1
10 TABLET ORAL DAILY
Qty: 30 TABLET | Refills: 3 | Status: SHIPPED | OUTPATIENT
Start: 2023-11-21

## 2023-11-21 SDOH — ECONOMIC STABILITY: INCOME INSECURITY: HOW HARD IS IT FOR YOU TO PAY FOR THE VERY BASICS LIKE FOOD, HOUSING, MEDICAL CARE, AND HEATING?: NOT HARD AT ALL

## 2023-11-21 SDOH — ECONOMIC STABILITY: HOUSING INSECURITY
IN THE LAST 12 MONTHS, WAS THERE A TIME WHEN YOU DID NOT HAVE A STEADY PLACE TO SLEEP OR SLEPT IN A SHELTER (INCLUDING NOW)?: NO

## 2023-11-21 SDOH — ECONOMIC STABILITY: FOOD INSECURITY: WITHIN THE PAST 12 MONTHS, YOU WORRIED THAT YOUR FOOD WOULD RUN OUT BEFORE YOU GOT MONEY TO BUY MORE.: NEVER TRUE

## 2023-11-21 SDOH — ECONOMIC STABILITY: FOOD INSECURITY: WITHIN THE PAST 12 MONTHS, THE FOOD YOU BOUGHT JUST DIDN'T LAST AND YOU DIDN'T HAVE MONEY TO GET MORE.: NEVER TRUE

## 2023-11-21 ASSESSMENT — ENCOUNTER SYMPTOMS
SHORTNESS OF BREATH: 0
NAUSEA: 0
CONSTIPATION: 0
DIARRHEA: 0
VOMITING: 0
ABDOMINAL PAIN: 0

## 2023-11-21 ASSESSMENT — PATIENT HEALTH QUESTIONNAIRE - PHQ9
SUM OF ALL RESPONSES TO PHQ QUESTIONS 1-9: 0
SUM OF ALL RESPONSES TO PHQ QUESTIONS 1-9: 0
1. LITTLE INTEREST OR PLEASURE IN DOING THINGS: 0
2. FEELING DOWN, DEPRESSED OR HOPELESS: 0
SUM OF ALL RESPONSES TO PHQ QUESTIONS 1-9: 0
SUM OF ALL RESPONSES TO PHQ QUESTIONS 1-9: 0
SUM OF ALL RESPONSES TO PHQ9 QUESTIONS 1 & 2: 0

## 2023-11-21 NOTE — PROGRESS NOTES
Attending Physician Statement  I have discussed the care of Peewee Dorsey pertinent history and exam findings,  with the resident. I have reviewed the key elements of all parts of the encounter with the resident. I agree with the assessment, plan and orders as documented by the resident.   (GE Modifier)  FU on Depression- stable no concerns

## 2024-05-11 ENCOUNTER — HOSPITAL ENCOUNTER (EMERGENCY)
Age: 29
Discharge: HOME | End: 2024-05-11

## 2024-06-05 ENCOUNTER — APPOINTMENT (OUTPATIENT)
Dept: GENERAL RADIOLOGY | Age: 29
End: 2024-06-05
Payer: MEDICAID

## 2024-06-05 ENCOUNTER — HOSPITAL ENCOUNTER (EMERGENCY)
Age: 29
Discharge: HOME | End: 2024-06-05
Attending: EMERGENCY MEDICINE
Payer: MEDICAID

## 2024-06-05 VITALS
OXYGEN SATURATION: 100 % | SYSTOLIC BLOOD PRESSURE: 110 MMHG | DIASTOLIC BLOOD PRESSURE: 81 MMHG | WEIGHT: 127.65 LBS | TEMPERATURE: 97.7 F | RESPIRATION RATE: 18 BRPM | HEART RATE: 67 BPM | BODY MASS INDEX: 17.31 KG/M2

## 2024-06-05 DIAGNOSIS — M25.511 ACUTE PAIN OF RIGHT SHOULDER: Primary | ICD-10-CM

## 2024-06-05 DIAGNOSIS — S20.211A CONTUSION OF RIGHT CHEST WALL, INITIAL ENCOUNTER: ICD-10-CM

## 2024-06-05 PROCEDURE — 71046 X-RAY EXAM CHEST 2 VIEWS: CPT

## 2024-06-05 PROCEDURE — 99283 EMERGENCY DEPT VISIT LOW MDM: CPT

## 2024-06-05 PROCEDURE — 6370000000 HC RX 637 (ALT 250 FOR IP): Performed by: EMERGENCY MEDICINE

## 2024-06-05 RX ORDER — IBUPROFEN 800 MG/1
800 TABLET ORAL ONCE
Status: COMPLETED | OUTPATIENT
Start: 2024-06-05 | End: 2024-06-05

## 2024-06-05 RX ORDER — IBUPROFEN 600 MG/1
600 TABLET ORAL 3 TIMES DAILY PRN
Qty: 30 TABLET | Refills: 0 | Status: SHIPPED | OUTPATIENT
Start: 2024-06-05

## 2024-06-05 RX ADMIN — IBUPROFEN 800 MG: 800 TABLET, FILM COATED ORAL at 12:22

## 2024-06-05 ASSESSMENT — ENCOUNTER SYMPTOMS: SHORTNESS OF BREATH: 0

## 2024-06-05 ASSESSMENT — PAIN SCALES - GENERAL: PAINLEVEL_OUTOF10: 10
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
## 2025-07-13 ENCOUNTER — APPOINTMENT (OUTPATIENT)
Dept: GENERAL RADIOLOGY | Age: 30
End: 2025-07-13
Payer: MEDICAID

## 2025-07-13 ENCOUNTER — HOSPITAL ENCOUNTER (EMERGENCY)
Age: 30
Discharge: HOME | End: 2025-07-14
Attending: EMERGENCY MEDICINE
Payer: MEDICAID

## 2025-07-13 VITALS
SYSTOLIC BLOOD PRESSURE: 120 MMHG | RESPIRATION RATE: 16 BRPM | DIASTOLIC BLOOD PRESSURE: 79 MMHG | TEMPERATURE: 98.4 F | OXYGEN SATURATION: 98 % | HEART RATE: 65 BPM

## 2025-07-13 DIAGNOSIS — M25.561 ACUTE PAIN OF RIGHT KNEE: Primary | ICD-10-CM

## 2025-07-13 PROCEDURE — 6370000000 HC RX 637 (ALT 250 FOR IP)

## 2025-07-13 PROCEDURE — 99283 EMERGENCY DEPT VISIT LOW MDM: CPT

## 2025-07-13 PROCEDURE — 73562 X-RAY EXAM OF KNEE 3: CPT

## 2025-07-13 RX ORDER — IBUPROFEN 400 MG/1
400 TABLET, FILM COATED ORAL ONCE
Status: COMPLETED | OUTPATIENT
Start: 2025-07-13 | End: 2025-07-13

## 2025-07-13 RX ORDER — ACETAMINOPHEN 500 MG
500 TABLET ORAL ONCE
Status: COMPLETED | OUTPATIENT
Start: 2025-07-13 | End: 2025-07-13

## 2025-07-13 RX ADMIN — ACETAMINOPHEN 500 MG: 500 TABLET ORAL at 23:34

## 2025-07-13 RX ADMIN — IBUPROFEN 400 MG: 400 TABLET ORAL at 23:34

## 2025-07-14 LAB
HIV 1+2 AB+HIV1 P24 AG SERPL QL IA: NONREACTIVE
T PALLIDUM AB SER QL IA: NONREACTIVE

## 2025-07-14 PROCEDURE — 87389 HIV-1 AG W/HIV-1&-2 AB AG IA: CPT

## 2025-07-14 PROCEDURE — 86780 TREPONEMA PALLIDUM: CPT

## 2025-07-14 RX ORDER — NAPROXEN 500 MG/1
500 TABLET ORAL 2 TIMES DAILY WITH MEALS
Qty: 60 TABLET | Refills: 0 | Status: SHIPPED | OUTPATIENT
Start: 2025-07-14 | End: 2025-07-14

## 2025-07-14 RX ORDER — NAPROXEN 500 MG/1
500 TABLET ORAL 2 TIMES DAILY WITH MEALS
Qty: 60 TABLET | Refills: 0 | Status: SHIPPED | OUTPATIENT
Start: 2025-07-14

## 2025-07-14 ASSESSMENT — PAIN - FUNCTIONAL ASSESSMENT: PAIN_FUNCTIONAL_ASSESSMENT: NONE - DENIES PAIN

## 2025-07-14 NOTE — H&P
[facility] EMERGENCY DEPARTMENT  Emergency Department Encounter  Emergency Medicine Resident     Pt Name:Maciej Jesus Sr.  MRN: [id number]  Birthdate 1995  Date of evaluation: 7/13/25  PCP:  No primary care provider on file.  Note Started: 11:20 PM EDT      CHIEF COMPLAINT       Chief Complaint   Patient presents with    Knee Injury     R knee; twisted about 1 month ago       HISTORY OF PRESENT ILLNESS  (Location/Symptom, Timing/Onset, Context/Setting, Quality, Duration, Modifying Factors, Severity.)      Maciej Jesus Sr. is a 30 y.o. male who presents with the chief complaint of right knee pain. Pain began approximately one month ago when he was dancing. Patient did a dance move that involved deep right knee flexion and he heard an audible pop. He is ambulatory and has been ambulatory since the incident but states that his knee feels unstable while walking. He also states that his knee becomes stiff when he is non ambulatory for a prolonged period of time.      Patient requested a STD check. Patient admits to two painless small bumps that appeared within the last month after having unprotected sex w/ a female. Patient states that these come and go and that he has started using a new body wash. Patient denies that the bumps are painful and denies difficulty/ painful urination or abnormal discharge.   PAST MEDICAL / SURGICAL / SOCIAL / FAMILY HISTORY      has no past medical history on file. Denies any current medical problems     has no past surgical history on file.  Denies surgeries    Social History     Socioeconomic History    Marital status: Single     Spouse name: Not on file    Number of children: Not on file    Years of education: Not on file    Highest education level: Not on file   Occupational History    Not on file   Tobacco Use    Smoking status: Every Day     Current packs/day: 1.00     Types: Cigarettes    Smokeless tobacco: Never   Substance and Sexual Activity    Alcohol use: Yes

## 2025-07-14 NOTE — ED PROVIDER NOTES
[facility]     Emergency Department     Faculty Attestation    I performed a history and physical examination of the patient and discussed management with the resident. I have reviewed and agree with the resident’s findings including all diagnostic interpretations, and treatment plans as written. Any areas of disagreement are noted on the chart. I was personally present for the key portions of any procedures. I have documented in the chart those procedures where I was not present during the key portions. I have reviewed the emergency nurses triage note. I agree with the chief complaint, past medical history, past surgical history, allergies, medications, social and family history as documented unless otherwise noted below. Documentation of the HPI, Physical Exam and Medical Decision Making performed by lay is based on my personal performance of the HPI, PE and MDM. For Physician Assistant/ Nurse Practitioner cases/documentation I have personally evaluated this patient and have completed at least one if not all key elements of the E/M (history, physical exam, and MDM). Additional findings are as noted.    Note Started: 11:19 PM EDT     31 yo M c/o twisting R knee, no direct blow, no fever,   PE vss gcs 15 tender R medial knee, no deformity, no tenderness to right calf, no swelling right calf, nv intact right lower extremity  --xr R knee-, pt request std test, given ortho referral  EKG Interpretation    Interpreted by me      CRITICAL CARE: There was a high probability of clinically significant/life threatening deterioration in this patient's condition which required my urgent intervention.  Total critical care time was 0 minutes.  This excludes any time for separately reportable procedures.       Wyatt Flores DO  07/13/25 [id number]       Wyatt Sanon DO  07/14/25 0029       Wyatt Sanon DO  07/14/25 0306

## 2025-07-14 NOTE — ED TRIAGE NOTES
Patient presents to the ED ambulatory from Triage. Patient states that he has had knee pain for the past month. Patient is in NAD, respirations are even and unlabored, bed in lowest position and call light with in reach. Writer will continue with plan of care.

## 2025-07-14 NOTE — DISCHARGE INSTRUCTIONS
You were seen today for right knee pain. You will need further imagine with orthopedics to identify a possible ligamentous injury. A orthopedic referral has been included in this paperwork. If any of the below occur please return. If you begin to have systemic symptoms such as a fever, if you are unable to walk please come back in. IF you notice calf swelling or are unable to feel your leg or feet. If there are any other symptoms that you are worried about that are not stated above please don't hesitate to come back.